# Patient Record
Sex: FEMALE | Race: WHITE | Employment: UNEMPLOYED | URBAN - METROPOLITAN AREA
[De-identification: names, ages, dates, MRNs, and addresses within clinical notes are randomized per-mention and may not be internally consistent; named-entity substitution may affect disease eponyms.]

---

## 2017-05-23 PROBLEM — Z71.2 PERSON CONSULTING FOR EXPLANATION OF EXAMINATION OR TEST FINDING: Status: ACTIVE | Noted: 2017-05-23

## 2022-12-13 ENCOUNTER — APPOINTMENT (OUTPATIENT)
Dept: CT IMAGING | Age: 67
End: 2022-12-13
Payer: MEDICARE

## 2022-12-13 ENCOUNTER — APPOINTMENT (OUTPATIENT)
Dept: GENERAL RADIOLOGY | Age: 67
End: 2022-12-13
Payer: MEDICARE

## 2022-12-13 ENCOUNTER — HOSPITAL ENCOUNTER (INPATIENT)
Age: 67
LOS: 2 days | Discharge: HOME | End: 2022-12-15
Attending: EMERGENCY MEDICINE | Admitting: INTERNAL MEDICINE
Payer: MEDICARE

## 2022-12-13 DIAGNOSIS — R11.0 NAUSEA: Primary | ICD-10-CM

## 2022-12-13 DIAGNOSIS — R42 DIZZINESS: ICD-10-CM

## 2022-12-13 PROBLEM — R26.81 UNSTEADY GAIT: Status: ACTIVE | Noted: 2022-12-13

## 2022-12-13 LAB
ALBUMIN SERPL-MCNC: 4.2 G/DL (ref 3.4–5)
ALP BLD-CCNC: 111 U/L (ref 40–129)
ALT SERPL-CCNC: 20 U/L (ref 10–40)
ANION GAP SERPL CALCULATED.3IONS-SCNC: 11 MMOL/L (ref 3–16)
AST SERPL-CCNC: 27 U/L (ref 15–37)
BASOPHILS ABSOLUTE: 0.1 K/UL (ref 0–0.2)
BASOPHILS RELATIVE PERCENT: 1 %
BILIRUB SERPL-MCNC: 0.6 MG/DL (ref 0–1)
BILIRUBIN DIRECT: <0.2 MG/DL (ref 0–0.3)
BILIRUBIN URINE: NEGATIVE
BILIRUBIN, INDIRECT: NORMAL MG/DL (ref 0–1)
BLOOD, URINE: NEGATIVE
BUN BLDV-MCNC: 15 MG/DL (ref 7–20)
CALCIUM SERPL-MCNC: 9.6 MG/DL (ref 8.3–10.6)
CHLORIDE BLD-SCNC: 106 MMOL/L (ref 99–110)
CLARITY: CLEAR
CO2: 26 MMOL/L (ref 21–32)
COLOR: YELLOW
CREAT SERPL-MCNC: 1.3 MG/DL (ref 0.6–1.2)
EKG ATRIAL RATE: 81 BPM
EKG DIAGNOSIS: NORMAL
EKG P AXIS: 54 DEGREES
EKG P-R INTERVAL: 168 MS
EKG Q-T INTERVAL: 394 MS
EKG QRS DURATION: 84 MS
EKG QTC CALCULATION (BAZETT): 457 MS
EKG R AXIS: 5 DEGREES
EKG T AXIS: 43 DEGREES
EKG VENTRICULAR RATE: 81 BPM
EOSINOPHILS ABSOLUTE: 0.3 K/UL (ref 0–0.6)
EOSINOPHILS RELATIVE PERCENT: 3.4 %
GFR SERPL CREATININE-BSD FRML MDRD: 45 ML/MIN/{1.73_M2}
GLUCOSE BLD-MCNC: 139 MG/DL (ref 70–99)
GLUCOSE URINE: NEGATIVE MG/DL
HCT VFR BLD CALC: 41.4 % (ref 36–48)
HEMOGLOBIN: 14 G/DL (ref 12–16)
INR BLD: 1.67 (ref 0.87–1.14)
KETONES, URINE: ABNORMAL MG/DL
LEUKOCYTE ESTERASE, URINE: NEGATIVE
LIPASE: 27 U/L (ref 13–60)
LYMPHOCYTES ABSOLUTE: 3.1 K/UL (ref 1–5.1)
LYMPHOCYTES RELATIVE PERCENT: 39.2 %
MAGNESIUM: 2.1 MG/DL (ref 1.8–2.4)
MCH RBC QN AUTO: 31.4 PG (ref 26–34)
MCHC RBC AUTO-ENTMCNC: 33.9 G/DL (ref 31–36)
MCV RBC AUTO: 92.6 FL (ref 80–100)
MICROSCOPIC EXAMINATION: ABNORMAL
MONOCYTES ABSOLUTE: 0.6 K/UL (ref 0–1.3)
MONOCYTES RELATIVE PERCENT: 7.9 %
NEUTROPHILS ABSOLUTE: 3.9 K/UL (ref 1.7–7.7)
NEUTROPHILS RELATIVE PERCENT: 48.5 %
NITRITE, URINE: NEGATIVE
PDW BLD-RTO: 14.5 % (ref 12.4–15.4)
PH UA: 6 (ref 5–8)
PLATELET # BLD: 283 K/UL (ref 135–450)
PMV BLD AUTO: 9.6 FL (ref 5–10.5)
POTASSIUM REFLEX MAGNESIUM: 3.5 MMOL/L (ref 3.5–5.1)
PRO-BNP: 72 PG/ML (ref 0–124)
PROTEIN UA: NEGATIVE MG/DL
PROTHROMBIN TIME: 19.7 SEC (ref 11.7–14.5)
RAPID INFLUENZA  B AGN: NEGATIVE
RAPID INFLUENZA A AGN: NEGATIVE
RBC # BLD: 4.47 M/UL (ref 4–5.2)
SARS-COV-2, NAAT: NOT DETECTED
SODIUM BLD-SCNC: 143 MMOL/L (ref 136–145)
SPECIFIC GRAVITY UA: >=1.03 (ref 1–1.03)
TOTAL PROTEIN: 7.7 G/DL (ref 6.4–8.2)
TROPONIN: <0.01 NG/ML
URINE REFLEX TO CULTURE: ABNORMAL
URINE TYPE: ABNORMAL
UROBILINOGEN, URINE: 0.2 E.U./DL
WBC # BLD: 8 K/UL (ref 4–11)

## 2022-12-13 PROCEDURE — 6360000004 HC RX CONTRAST MEDICATION: Performed by: EMERGENCY MEDICINE

## 2022-12-13 PROCEDURE — 87804 INFLUENZA ASSAY W/OPTIC: CPT

## 2022-12-13 PROCEDURE — 84484 ASSAY OF TROPONIN QUANT: CPT

## 2022-12-13 PROCEDURE — 2580000003 HC RX 258: Performed by: INTERNAL MEDICINE

## 2022-12-13 PROCEDURE — 70450 CT HEAD/BRAIN W/O DYE: CPT

## 2022-12-13 PROCEDURE — 6360000002 HC RX W HCPCS: Performed by: PHYSICIAN ASSISTANT

## 2022-12-13 PROCEDURE — 80076 HEPATIC FUNCTION PANEL: CPT

## 2022-12-13 PROCEDURE — 81003 URINALYSIS AUTO W/O SCOPE: CPT

## 2022-12-13 PROCEDURE — 80048 BASIC METABOLIC PNL TOTAL CA: CPT

## 2022-12-13 PROCEDURE — 6370000000 HC RX 637 (ALT 250 FOR IP): Performed by: INTERNAL MEDICINE

## 2022-12-13 PROCEDURE — 83880 ASSAY OF NATRIURETIC PEPTIDE: CPT

## 2022-12-13 PROCEDURE — 1200000000 HC SEMI PRIVATE

## 2022-12-13 PROCEDURE — 85025 COMPLETE CBC W/AUTO DIFF WBC: CPT

## 2022-12-13 PROCEDURE — 71045 X-RAY EXAM CHEST 1 VIEW: CPT

## 2022-12-13 PROCEDURE — 83690 ASSAY OF LIPASE: CPT

## 2022-12-13 PROCEDURE — 36415 COLL VENOUS BLD VENIPUNCTURE: CPT

## 2022-12-13 PROCEDURE — 87635 SARS-COV-2 COVID-19 AMP PRB: CPT

## 2022-12-13 PROCEDURE — 99285 EMERGENCY DEPT VISIT HI MDM: CPT

## 2022-12-13 PROCEDURE — 96374 THER/PROPH/DIAG INJ IV PUSH: CPT

## 2022-12-13 PROCEDURE — 2580000003 HC RX 258: Performed by: PHYSICIAN ASSISTANT

## 2022-12-13 PROCEDURE — 85610 PROTHROMBIN TIME: CPT

## 2022-12-13 PROCEDURE — 84443 ASSAY THYROID STIM HORMONE: CPT

## 2022-12-13 PROCEDURE — 93005 ELECTROCARDIOGRAM TRACING: CPT | Performed by: PHYSICIAN ASSISTANT

## 2022-12-13 PROCEDURE — 70496 CT ANGIOGRAPHY HEAD: CPT

## 2022-12-13 PROCEDURE — 83735 ASSAY OF MAGNESIUM: CPT

## 2022-12-13 RX ORDER — ATORVASTATIN CALCIUM 40 MG/1
40 TABLET, FILM COATED ORAL DAILY
COMMUNITY

## 2022-12-13 RX ORDER — SODIUM CHLORIDE 9 MG/ML
INJECTION, SOLUTION INTRAVENOUS PRN
Status: DISCONTINUED | OUTPATIENT
Start: 2022-12-13 | End: 2022-12-15 | Stop reason: HOSPADM

## 2022-12-13 RX ORDER — 0.9 % SODIUM CHLORIDE 0.9 %
1000 INTRAVENOUS SOLUTION INTRAVENOUS ONCE
Status: COMPLETED | OUTPATIENT
Start: 2022-12-13 | End: 2022-12-13

## 2022-12-13 RX ORDER — ACETAMINOPHEN 650 MG/1
650 SUPPOSITORY RECTAL EVERY 6 HOURS PRN
Status: DISCONTINUED | OUTPATIENT
Start: 2022-12-13 | End: 2022-12-15 | Stop reason: HOSPADM

## 2022-12-13 RX ORDER — WARFARIN SODIUM 1 MG/1
1.5 TABLET ORAL
Status: COMPLETED | OUTPATIENT
Start: 2022-12-13 | End: 2022-12-13

## 2022-12-13 RX ORDER — ONDANSETRON 2 MG/ML
4 INJECTION INTRAMUSCULAR; INTRAVENOUS ONCE
Status: COMPLETED | OUTPATIENT
Start: 2022-12-13 | End: 2022-12-13

## 2022-12-13 RX ORDER — ACETAMINOPHEN 325 MG/1
650 TABLET ORAL EVERY 6 HOURS PRN
COMMUNITY

## 2022-12-13 RX ORDER — SODIUM CHLORIDE 0.9 % (FLUSH) 0.9 %
5-40 SYRINGE (ML) INJECTION PRN
Status: DISCONTINUED | OUTPATIENT
Start: 2022-12-13 | End: 2022-12-15 | Stop reason: HOSPADM

## 2022-12-13 RX ORDER — PROPRANOLOL HCL 60 MG
60 CAPSULE, EXTENDED RELEASE 24HR ORAL DAILY
Status: DISCONTINUED | OUTPATIENT
Start: 2022-12-14 | End: 2022-12-15 | Stop reason: HOSPADM

## 2022-12-13 RX ORDER — ALBUTEROL SULFATE 2.5 MG/3ML
2.5 SOLUTION RESPIRATORY (INHALATION) EVERY 4 HOURS PRN
Status: DISCONTINUED | OUTPATIENT
Start: 2022-12-13 | End: 2022-12-15 | Stop reason: HOSPADM

## 2022-12-13 RX ORDER — VIT C/B6/B5/MAGNESIUM/HERB 173 50-5-6-5MG
500 CAPSULE ORAL DAILY
COMMUNITY

## 2022-12-13 RX ORDER — ALBUTEROL SULFATE 2.5 MG/3ML
2.5 SOLUTION RESPIRATORY (INHALATION) 4 TIMES DAILY
Status: DISCONTINUED | OUTPATIENT
Start: 2022-12-13 | End: 2022-12-13

## 2022-12-13 RX ORDER — SODIUM CHLORIDE, SODIUM LACTATE, POTASSIUM CHLORIDE, CALCIUM CHLORIDE 600; 310; 30; 20 MG/100ML; MG/100ML; MG/100ML; MG/100ML
INJECTION, SOLUTION INTRAVENOUS CONTINUOUS
Status: ACTIVE | OUTPATIENT
Start: 2022-12-13 | End: 2022-12-14

## 2022-12-13 RX ORDER — POLYETHYLENE GLYCOL 3350 17 G/17G
17 POWDER, FOR SOLUTION ORAL DAILY PRN
Status: DISCONTINUED | OUTPATIENT
Start: 2022-12-13 | End: 2022-12-15 | Stop reason: HOSPADM

## 2022-12-13 RX ORDER — LEVOTHYROXINE SODIUM 0.07 MG/1
75 TABLET ORAL DAILY
Status: DISCONTINUED | OUTPATIENT
Start: 2022-12-14 | End: 2022-12-15 | Stop reason: HOSPADM

## 2022-12-13 RX ORDER — LABETALOL HYDROCHLORIDE 5 MG/ML
10 INJECTION, SOLUTION INTRAVENOUS EVERY 4 HOURS PRN
Status: DISCONTINUED | OUTPATIENT
Start: 2022-12-13 | End: 2022-12-15 | Stop reason: HOSPADM

## 2022-12-13 RX ORDER — DULOXETIN HYDROCHLORIDE 60 MG/1
60 CAPSULE, DELAYED RELEASE ORAL 2 TIMES DAILY
Status: DISCONTINUED | OUTPATIENT
Start: 2022-12-13 | End: 2022-12-15 | Stop reason: HOSPADM

## 2022-12-13 RX ORDER — ACETAMINOPHEN 325 MG/1
650 TABLET ORAL EVERY 6 HOURS PRN
Status: DISCONTINUED | OUTPATIENT
Start: 2022-12-13 | End: 2022-12-15 | Stop reason: HOSPADM

## 2022-12-13 RX ORDER — ONDANSETRON 2 MG/ML
4 INJECTION INTRAMUSCULAR; INTRAVENOUS EVERY 6 HOURS PRN
Status: DISCONTINUED | OUTPATIENT
Start: 2022-12-13 | End: 2022-12-15 | Stop reason: HOSPADM

## 2022-12-13 RX ORDER — SODIUM CHLORIDE 0.9 % (FLUSH) 0.9 %
5-40 SYRINGE (ML) INJECTION EVERY 12 HOURS SCHEDULED
Status: DISCONTINUED | OUTPATIENT
Start: 2022-12-13 | End: 2022-12-15 | Stop reason: HOSPADM

## 2022-12-13 RX ORDER — LEVOTHYROXINE SODIUM 0.07 MG/1
75 TABLET ORAL DAILY
COMMUNITY

## 2022-12-13 RX ORDER — VIT C/B6/B5/MAGNESIUM/HERB 173 50-5-6-5MG
500 CAPSULE ORAL DAILY
Status: DISCONTINUED | OUTPATIENT
Start: 2022-12-14 | End: 2022-12-13

## 2022-12-13 RX ORDER — ONDANSETRON 4 MG/1
4 TABLET, ORALLY DISINTEGRATING ORAL EVERY 8 HOURS PRN
Status: DISCONTINUED | OUTPATIENT
Start: 2022-12-13 | End: 2022-12-14

## 2022-12-13 RX ORDER — ATORVASTATIN CALCIUM 40 MG/1
40 TABLET, FILM COATED ORAL DAILY
Status: DISCONTINUED | OUTPATIENT
Start: 2022-12-14 | End: 2022-12-15 | Stop reason: HOSPADM

## 2022-12-13 RX ADMIN — WARFARIN SODIUM 1.5 MG: 1 TABLET ORAL at 23:51

## 2022-12-13 RX ADMIN — SODIUM CHLORIDE, PRESERVATIVE FREE 10 ML: 5 INJECTION INTRAVENOUS at 23:48

## 2022-12-13 RX ADMIN — IOPAMIDOL 75 ML: 755 INJECTION, SOLUTION INTRAVENOUS at 16:52

## 2022-12-13 RX ADMIN — SODIUM CHLORIDE 1000 ML: 9 INJECTION, SOLUTION INTRAVENOUS at 15:04

## 2022-12-13 RX ADMIN — ONDANSETRON 4 MG: 2 INJECTION INTRAMUSCULAR; INTRAVENOUS at 15:02

## 2022-12-13 ASSESSMENT — ENCOUNTER SYMPTOMS
CHEST TIGHTNESS: 0
DIARRHEA: 0
NAUSEA: 1
CONSTIPATION: 0
SHORTNESS OF BREATH: 1
VOMITING: 1

## 2022-12-13 ASSESSMENT — PAIN - FUNCTIONAL ASSESSMENT: PAIN_FUNCTIONAL_ASSESSMENT: NONE - DENIES PAIN

## 2022-12-13 NOTE — PROGRESS NOTES
Pharmacy  Note  - Admission Medication History    List of [id number] medications is complete. I reviewed Rx fill history via \"Complete Dispense Report\" in Epic, and spoke to the patient. The following changes made to [id number] medication list:    ADDED:   1) atorvastatin  2) levothyroxine  3) acetaminophen  4) tumeric     REMOVED:  1) Excedrin   2) diazepam   3) doxycycline   4) esomeprazole  5) Hydromet  6) Dulera     Please Note:   1) The patient reports that they have not started taking the azithromycin that was recently prescribed. Current Outpatient Medications   Medication Instructions    acetaminophen (TYLENOL) 650 mg, Oral, EVERY 6 HOURS PRN    albuterol sulfate HFA (PROVENTIL HFA) 108 (90 BASE) MCG/ACT inhaler 2 puffs, Inhalation, EVERY 4 HOURS PRN, Space out to every 6 hours as symptoms improve.     atorvastatin (LIPITOR) 40 mg, Oral, DAILY    DULoxetine (CYMBALTA) 60 MG extended release capsule TAKE 1 CAPSULE BY MOUTH TWICE DAILY    levothyroxine (SYNTHROID) 75 mcg, Oral, DAILY    propranolol (INDERAL LA) 60 mg, Oral, DAILY    turmeric 500 mg, Oral, DAILY    warfarin (COUMADIN) 3 MG tablet TAKE 1 TABLET BY MOUTH DAILY      12/13/2022 6:49 PM  Kamille Galvin  PharmD Candidate Class of [address]

## 2022-12-13 NOTE — ED PROVIDER NOTES
[address] [facility] [address] ENCOUNTER          PHYSICIAN ASSISTANT NOTE     Date of evaluation: 12/13/2022    ADDENDUM:      Care of this patient was assumed from Polly Meek PA-C. The patient was seen for Illness  . The patient's initial evaluation and plan have been discussed with the prior provider who initially evaluated the patient. Nursing Notes, Past Medical Hx, Past Surgical Hx, Social Hx, Allergies, and Family Hx were all reviewed. Diagnostic Results         RADIOLOGY:  CTA HEAD NECK W CONTRAST   Final Result   1. Normal right internal carotid artery. .   2. Normal left internal carotid artery   3. Normal vertebral arteries. 4. Normal intracranial circulation. CT HEAD WO CONTRAST   Final Result   1. Normal brain      XR CHEST PORTABLE   Final Result   Impression: No acute cardiopulmonary abnormality.       MRI BRAIN WO CONTRAST    (Results Pending)       LABS:   Results for orders placed or performed during the hospital encounter of 12/13/22   COVID-19, Rapid    Specimen: Nasopharyngeal Swab   Result Value Ref Range    SARS-CoV-2, NAAT Not Detected Not Detected   Rapid Flu Swab    Specimen: Nasopharyngeal   Result Value Ref Range    Rapid Influenza A Ag Negative Negative    Rapid Influenza B Ag Negative Negative   BMP w/ Reflex to MG   Result Value Ref Range    Sodium 143 136 - 145 mmol/L    Potassium reflex Magnesium 3.5 3.5 - 5.1 mmol/L    Chloride 106 99 - 110 mmol/L    CO2 26 21 - 32 mmol/L    Anion Gap 11 3 - 16    Glucose 139 (H) 70 - 99 mg/dL    BUN 15 7 - 20 mg/dL    Creatinine 1.3 (H) 0.6 - 1.2 mg/dL    Est, Glom Filt Rate 45 (A) >60    Calcium 9.6 8.3 - 10.6 mg/dL   CBC with Auto Differential   Result Value Ref Range    WBC 8.0 4.0 - 11.0 K/uL    RBC 4.47 4.00 - 5.20 M/uL    Hemoglobin 14.0 12.0 - 16.0 g/dL    Hematocrit 41.4 36.0 - 48.0 %    MCV 92.6 80.0 - 100.0 fL    MCH 31.4 26.0 - 34.0 pg    MCHC 33.9 31.0 - 36.0 g/dL    RDW 14.5 12.4 - 15.4 %    Platelets 072 281 - 450 K/uL    MPV 9.6 5.0 - 10.5 fL    Neutrophils % 48.5 %    Lymphocytes % 39.2 %    Monocytes % 7.9 %    Eosinophils % 3.4 %    Basophils % 1.0 %    Neutrophils Absolute 3.9 1.7 - 7.7 K/uL    Lymphocytes Absolute 3.1 1.0 - 5.1 K/uL    Monocytes Absolute 0.6 0.0 - 1.3 K/uL    Eosinophils Absolute 0.3 0.0 - 0.6 K/uL    Basophils Absolute 0.1 0.0 - 0.2 K/uL   Hepatic Function Panel   Result Value Ref Range    Total Protein 7.7 6.4 - 8.2 g/dL    Albumin 4.2 3.4 - 5.0 g/dL    Alkaline Phosphatase 111 40 - 129 U/L    ALT 20 10 - 40 U/L    AST 27 15 - 37 U/L    Total Bilirubin 0.6 0.0 - 1.0 mg/dL    Bilirubin, Direct <0.2 0.0 - 0.3 mg/dL    Bilirubin, Indirect see below 0.0 - 1.0 mg/dL   Lipase   Result Value Ref Range    Lipase 27.0 13.0 - 60.0 U/L   Troponin   Result Value Ref Range    Troponin <0.01 <0.01 ng/mL   Brain Natriuretic Peptide   Result Value Ref Range    Pro-BNP 72 0 - 124 pg/mL   Protime-INR   Result Value Ref Range    Protime 19.7 (H) 11.7 - 14.5 sec    INR 1.67 (H) 0.87 - 1.14   Magnesium   Result Value Ref Range    Magnesium 2.10 1.80 - 2.40 mg/dL   Urinalysis with Reflex to Culture    Specimen: Urine   Result Value Ref Range    Color, UA Yellow Straw/Yellow    Clarity, UA Clear Clear    Glucose, Ur Negative Negative mg/dL    Bilirubin Urine Negative Negative    Ketones, Urine TRACE (A) Negative mg/dL    Specific Gravity, UA >=1.030 1.005 - 1.030    Blood, Urine Negative Negative    pH, UA 6.0 5.0 - 8.0    Protein, UA Negative Negative mg/dL    Urobilinogen, Urine 0.2 <2.0 E.U./dL    Nitrite, Urine Negative Negative    Leukocyte Esterase, Urine Negative Negative    Microscopic Examination Not Indicated     Urine Type Voided     Urine Reflex to Culture Not Indicated    EKG 12 Lead   Result Value Ref Range    Ventricular Rate 81 BPM    Atrial Rate 81 BPM    P-R Interval 168 ms    QRS Duration 84 ms    Q-T Interval 394 ms    QTc Calculation (Bazett) 457 ms    P Axis 54 degrees    R Axis 5 degrees    T Axis 43 degrees    Diagnosis       EKG performed in ER and to be interpreted by ER physician. Confirmed by MD, ER ([id number]),  Marco A Garza ([id number]) on 12/13/2022 3:15:32 PM       RECENT VITALS:  BP: (!) 164/69, Temp: 97.8 °F (36.6 °C), Heart Rate: 79, Resp: 18, SpO2: 96 %       ED Course     The patient was given the following medications:  Orders Placed This Encounter   Medications    ondansetron (ZOFRAN) injection 4 mg    0.9 % sodium chloride bolus    iopamidol (ISOVUE-370) 76 % injection 75 mL    DISCONTD: albuterol (PROVENTIL) nebulizer solution 2.5 mg     Order Specific Question:   Initiate RT Bronchodilator Protocol     Answer:   Yes - Inpatient Protocol    atorvastatin (LIPITOR) tablet 40 mg    DULoxetine (CYMBALTA) extended release capsule 60 mg    levothyroxine (SYNTHROID) tablet 75 mcg    propranolol (INDERAL LA) extended release capsule 60 mg    DISCONTD: turmeric 500 MG capsule 500 mg    sodium chloride flush 0.9 % injection 5-40 mL    sodium chloride flush 0.9 % injection 5-40 mL    0.9 % sodium chloride infusion    OR Linked Order Group     ondansetron (ZOFRAN-ODT) disintegrating tablet 4 mg     ondansetron (ZOFRAN) injection 4 mg    polyethylene glycol (GLYCOLAX) packet 17 g    OR Linked Order Group     acetaminophen (TYLENOL) tablet 650 mg     acetaminophen (TYLENOL) suppository 650 mg    lactated ringers infusion    labetalol (NORMODYNE;TRANDATE) injection 10 mg    albuterol (PROVENTIL) nebulizer solution 2.5 mg     Order Specific Question:   Initiate RT Bronchodilator Protocol     Answer:   Yes - Inpatient Protocol    warfarin placeholder: dosing by pharmacy     Order Specific Question:   What is the patient's goal INR? Answer:   2.0 - 3.0    warfarin (COUMADIN) tablet 1.5 mg     Order Specific Question:   Indication of Use     Answer:   Treatment-DVT/PE     Order Specific Question:   What is the patient's goal INR?      Answer:   2.0 - 3.0       CONSULTS:  IP CONSULT TO HOSPITALIST  IP CONSULT TO PHARMACY  IP CONSULT TO NEUROLOGY  IP CONSULT TO SOCIAL WORK    MEDICAL DECISION MAKING / ASSESSMENT / Omer  is a 79 y.o. female who presents the emergency department with nausea and dizziness. Vital signs stable on presentation remained stable throughout her stay. Please see previous providers documentation for initial history and physical exam.  On turnover the patient we are awaiting CTA head and neck along with Noncon of the head. CTA head and neck showed normal right internal carotid artery, left internal carotid artery, normal vertebral arteries, normal intracranial circulation. CT head was unremarkable. On reassessment patient does feel mildly improved after nausea control. At this time, I do believe she should be admitted to the hospital due to concern for posterior stroke with her mild ataxia and other symptoms. This was discussed with the patient who is agreeable. Call was placed to the hospitalist to discuss admission. This patient was also evaluated by the attending physician. All care plans were discussed and agreed upon. Clinical Impression     1. Nausea    2. Dizziness        Disposition     PATIENT REFERRED TO:  No follow-up provider specified.     DISCHARGE MEDICATIONS:  Current Discharge Medication List          DISPOSITION Admitted 12/13/2022 07:35:56 PM         Koko Mcgarry PA-C  12/14/22 0126

## 2022-12-13 NOTE — ED PROVIDER NOTES
ED Attending Attestation Note     Date of evaluation: 12/13/2022    This patient was seen by the advance practice provider. I have seen and examined the patient, agree with the workup, evaluation, management and diagnosis. The care plan has been discussed. My assessment reveals a 80-year-old female who presents with chief complaint of illness. Patient has had 2 to 3 weeks of nausea and vomiting. Intermittent. Was able to eat yesterday. States she was sent here by her doctor's office to get checked out. General: This is a wd/wn female in no acute distress who appears their stated age. Holding emesis bag but not actively vomiting. HEENT: NC/AT. PERRL. OP clear. MMM. Neck: Supple. Trachea midline. Pulmonary: Normal work of breathing, not using accessory muscles or pursed lip breathing    Cardiac: RRR    Abdomen: S/NT/ND/+BS. No cva tenderness. Musculoskeletal: WWP with no clubbing, cyanosis, or deformities noted. Vascular: +2 radial and DP pulses. Skin: Warm and dry with no lesions noted    Neuro:  AAOx4. Face is grossly symmetric. Gait not assessed. Moving all 4 extremities equally and spontaneously  .      Mariaa Johnston MD  12/13/22 [id number]

## 2022-12-13 NOTE — ED PROVIDER NOTES
[facility]  EMERGENCY DEPARTMENT ENCOUNTER          PHYSICIAN ASSISTANT NOTE       Date of evaluation: 12/13/2022    Chief Complaint     Illness    History of Present Illness     Cindy Garcia is a 79 y.o. female who presents to the emergency department for evaluation of persistent nausea and vomiting feeling generally unwell over the last 2 weeks. She initially said that she has not been able to keep anything down to eat or drink, but then said that she was able to eat a sandwich with something to drink last night and this morning. She has had persistent nausea. She reports episodes where she feels hot and flushed, then has nausea and gagging. No associated abdominal pain. No chest pain or shortness of breath, no fevers or chills. In addition she has had a mild cough with some shortness of breath, no chest pain. She has never had symptoms like this in the past and cannot identify any aggravating or relieving features. She has a history of PEs and is anticoagulated on Coumadin, also has a remote history of leukemia, with no active disease since 2004. She is not currently on chemotherapy or radiation. Review of Systems     Review of Systems   Constitutional:  Negative for chills, diaphoresis, fatigue and fever. Respiratory:  Positive for shortness of breath. Negative for chest tightness. Cardiovascular:  Negative for chest pain, palpitations and leg swelling. Gastrointestinal:  Positive for nausea and vomiting. Negative for constipation and diarrhea. Genitourinary:  Negative for dysuria, flank pain and frequency. Musculoskeletal:  Negative for myalgias. Skin:  Negative for pallor and rash. Neurological:  Negative for dizziness, weakness, numbness and headaches. Psychiatric/Behavioral:  Negative for confusion.       Past Medical, Surgical, Family, and Social History     She has a past medical history of Asthma, Cancer ([facility] [address] COPD (chronic obstructive pulmonary disease) ([facility] [address] Depression, GERD (gastroesophageal reflux disease), and PE (pulmonary embolism). She has a past surgical history that includes Cataract removal; Middle ear surgery; Hysterectomy; bone marrow transplant; and bone marrow biopsy. Her family history includes Cancer in her father; Diabetes in her brother and sister; Heart Disease in her mother; High Cholesterol in her sister; Hypertension in her sister. She reports that she quit smoking about 12 years ago. Her smoking use included cigarettes. She smoked an average of .5 packs per day. She has quit using smokeless tobacco. She reports that she does not drink alcohol and does not use drugs. Medications     Previous Medications    ALBUTEROL SULFATE HFA (PROVENTIL HFA) 108 (90 BASE) MCG/ACT INHALER    Inhale 2 puffs into the lungs every 4 hours as needed for Wheezing or Shortness of Breath (Space out to every 6 hours as symptoms improve) Space out to every 6 hours as symptoms improve. ASPIRIN-ACETAMINOPHEN-CAFFEINE (EXCEDRIN MIGRAINE) 250-250-65 MG PER TABLET    Take 1 tablet by mouth every 6 hours as needed for Pain. DIAZEPAM (VALIUM) 2 MG TABLET    Take 1-2 mg by mouth 2 times daily. DOXYCYCLINE (MONODOX) 50 MG CAPSULE    Take 1 capsule by mouth every 12 hours. DULOXETINE (CYMBALTA) 60 MG EXTENDED RELEASE CAPSULE    TAKE 1 CAPSULE BY MOUTH TWICE DAILY    ESOMEPRAZOLE (NEXIUM) 40 MG CAPSULE    TAKE 1 CAPSULE BY MOUTH EVERY MORNING BEFORE A MEAL    HYDROCODONE-HOMATROPINE (HYDROMET) 5-1.5 MG/5ML SYRUP    Take 5 mLs by mouth 2 times daily as needed. MOMETASONE-FORMOTEROL (DULERA) 100-5 MCG/ACT INHALER    Inhale 2 puffs into the lungs 2 times daily    NEXIUM 40 MG CAPSULE    TAKE ONE CAPSULE BY MOUTH EVERY MORNING BEFORE BREAKFAST    PROPRANOLOL (INDERAL LA) 80 MG CR CAPSULE    Take 60 mg by mouth daily     WARFARIN (COUMADIN) 3 MG TABLET    TAKE 1 TABLET BY MOUTH DAILY       Allergies     She is allergic to adhesive tape and pcn [penicillins].     Physical Exam     INITIAL VITALS: BP: (!) 162/108, Temp: 98.3 °F (36.8 °C), Heart Rate: 83, Resp: 20, SpO2: 99 %  Physical Exam  Vitals and nursing note reviewed. Constitutional:       General: She is not in acute distress. Appearance: Normal appearance. She is normal weight. She is not ill-appearing, toxic-appearing or diaphoretic. HENT:      Head: Normocephalic and atraumatic. Eyes:      Extraocular Movements: Extraocular movements intact. Pupils: Pupils are equal, round, and reactive to light. Cardiovascular:      Rate and Rhythm: Normal rate and regular rhythm. Pulses: Normal pulses. Heart sounds: Normal heart sounds. No murmur heard. No friction rub. No gallop. Pulmonary:      Effort: Pulmonary effort is normal. No respiratory distress. Breath sounds: Normal breath sounds. No stridor. No wheezing, rhonchi or rales. Abdominal:      General: There is no distension. Tenderness: There is no abdominal tenderness. There is no guarding or rebound. Musculoskeletal:         General: Normal range of motion. Cervical back: Normal range of motion. Right lower leg: No edema. Left lower leg: No edema. Skin:     General: Skin is warm. Neurological:      General: No focal deficit present. Mental Status: She is alert. Psychiatric:         Mood and Affect: Mood normal.         Behavior: Behavior normal.       Diagnostic Results     EKG   Interpreted in conjunction with emergency department physician Linus Zendejas MD  Rhythm: normal sinus   Rate: normal  Axis: normal  Ectopy: none  Conduction: normal  ST Segments: normal  T Waves: normal  Q Waves:none  Clinical Impression: no acute changes  Comparison:  4/20/17    RADIOLOGY:  CT HEAD WO CONTRAST   Final Result   1. Normal brain      XR CHEST PORTABLE   Final Result   Impression: No acute cardiopulmonary abnormality.       CTA HEAD NECK W CONTRAST    (Results Pending)       LABS:   Results for orders placed or performed during the hospital encounter of 12/13/22   BMP w/ Reflex to MG   Result Value Ref Range    Sodium 143 136 - 145 mmol/L    Potassium reflex Magnesium 3.5 3.5 - 5.1 mmol/L    Chloride 106 99 - 110 mmol/L    CO2 26 21 - 32 mmol/L    Anion Gap 11 3 - 16    Glucose 139 (H) 70 - 99 mg/dL    BUN 15 7 - 20 mg/dL    Creatinine 1.3 (H) 0.6 - 1.2 mg/dL    Est, Glom Filt Rate 45 (A) >60    Calcium 9.6 8.3 - 10.6 mg/dL   CBC with Auto Differential   Result Value Ref Range    WBC 8.0 4.0 - 11.0 K/uL    RBC 4.47 4.00 - 5.20 M/uL    Hemoglobin 14.0 12.0 - 16.0 g/dL    Hematocrit 41.4 36.0 - 48.0 %    MCV 92.6 80.0 - 100.0 fL    MCH 31.4 26.0 - 34.0 pg    MCHC 33.9 31.0 - 36.0 g/dL    RDW 14.5 12.4 - 15.4 %    Platelets 742 874 - 834 K/uL    MPV 9.6 5.0 - 10.5 fL    Neutrophils % 48.5 %    Lymphocytes % 39.2 %    Monocytes % 7.9 %    Eosinophils % 3.4 %    Basophils % 1.0 %    Neutrophils Absolute 3.9 1.7 - 7.7 K/uL    Lymphocytes Absolute 3.1 1.0 - 5.1 K/uL    Monocytes Absolute 0.6 0.0 - 1.3 K/uL    Eosinophils Absolute 0.3 0.0 - 0.6 K/uL    Basophils Absolute 0.1 0.0 - 0.2 K/uL   Hepatic Function Panel   Result Value Ref Range    Total Protein 7.7 6.4 - 8.2 g/dL    Albumin 4.2 3.4 - 5.0 g/dL    Alkaline Phosphatase 111 40 - 129 U/L    ALT 20 10 - 40 U/L    AST 27 15 - 37 U/L    Total Bilirubin 0.6 0.0 - 1.0 mg/dL    Bilirubin, Direct <0.2 0.0 - 0.3 mg/dL    Bilirubin, Indirect see below 0.0 - 1.0 mg/dL   Lipase   Result Value Ref Range    Lipase 27.0 13.0 - 60.0 U/L   Troponin   Result Value Ref Range    Troponin <0.01 <0.01 ng/mL   Brain Natriuretic Peptide   Result Value Ref Range    Pro-BNP 72 0 - 124 pg/mL   Protime-INR   Result Value Ref Range    Protime 19.7 (H) 11.7 - 14.5 sec    INR 1.67 (H) 0.87 - 1.14   Magnesium   Result Value Ref Range    Magnesium 2.10 1.80 - 2.40 mg/dL   EKG 12 Lead   Result Value Ref Range    Ventricular Rate 81 BPM    Atrial Rate 81 BPM    P-R Interval 168 ms    QRS Duration 84 ms    Q-T Interval 394 ms    QTc Calculation (Bazett) 457 ms    P Axis 54 degrees    R Axis 5 degrees    T Axis 43 degrees    Diagnosis       EKG performed in ER and to be interpreted by ER physician. Confirmed by MD, ER ([id number]),  [address], [address] ([id number]) on 12/13/2022 3:15:32 PM       ED BEDSIDE ULTRASOUND:  No results found. RECENT VITALS:  BP: (!) 176/89, Temp: 98.3 °F (36.8 °C), Heart Rate: 85, Resp: 18, SpO2: 96 %     Procedures   None    ED Course     Nursing Notes, Past Medical Hx,Past Surgical Hx, Social Hx, Allergies, and Family Hx were reviewed. The patient was given the following medications:  Orders Placed This Encounter   Medications    ondansetron (ZOFRAN) injection 4 mg    0.9 % sodium chloride bolus    iopamidol (ISOVUE-370) 76 % injection 75 mL       CONSULTS:  None    MEDICAL DECISION MAKING / ASSESSMENT / Raghu Ann is a 79 y.o. female presenting to the emergency department for evaluation of persistent nausea and vomiting over the last 9 days. She states that she feels hot and then has nausea with gagging although she was able to tolerate p.o. last night and today. Upon presentation she was well in appearance, seated on the stretcher in no acute distress with stable vitals. She was given Zofran and 1 L of IV fluids and was feeling much better on repeat exam and her nausea had improved. Medical work-up completed showing no leukocytosis or electrolyte abnormalities. Liver enzymes and lipase were both normal.  EKG completed showing normal sinus rhythm with low suspicion for atypical presentation for ACS and troponin was less than 0.01. Clinically she did not appear fluid overloaded. I went to discuss the symptoms with the patient further, she said that she did have an episode of double vision while driving on Friday, and felt very disoriented while driving today. In addition she is felt \"intoxicated\" since her symptoms started 9 days ago, specifically when walking.   She does not drink alcohol. When I ambulated the patient, she was unsteady on her feet, and mildly ataxic. She was able to provide a urine sample which is pending. Plan to expand work-up to assess for TIA versus stroke with CT head as well as CTA head and neck. Imaging studies are pending and my colleague Wyatt Casiano PA-C will follow up on imaging studies as well as disposition of the patient. Is outside of the window for any acute intervention for stroke since her symptoms have been persistent for greater than 1 week. This patient was also evaluated by the attending physician. All care plans were discussed and agreed upon. Clinical Impression     1. Nausea      Disposition     PATIENT REFERRED TO:  No follow-up provider specified.     DISCHARGE MEDICATIONS:  New Prescriptions    No medications on file       DISPOSITION          Sarabjit Srinivasan PA-C  12/13/22 0319

## 2022-12-14 ENCOUNTER — APPOINTMENT (OUTPATIENT)
Dept: MRI IMAGING | Age: 67
End: 2022-12-14
Payer: MEDICARE

## 2022-12-14 LAB
A/G RATIO: 1.1 (ref 1.1–2.2)
ALBUMIN SERPL-MCNC: 3.4 G/DL (ref 3.4–5)
ALP BLD-CCNC: 98 U/L (ref 40–129)
ALT SERPL-CCNC: 17 U/L (ref 10–40)
ANION GAP SERPL CALCULATED.3IONS-SCNC: 8 MMOL/L (ref 3–16)
ANION GAP SERPL CALCULATED.3IONS-SCNC: 9 MMOL/L (ref 3–16)
AST SERPL-CCNC: 25 U/L (ref 15–37)
BASOPHILS ABSOLUTE: 0.1 K/UL (ref 0–0.2)
BASOPHILS RELATIVE PERCENT: 0.7 %
BILIRUB SERPL-MCNC: 0.4 MG/DL (ref 0–1)
BUN BLDV-MCNC: 15 MG/DL (ref 7–20)
BUN BLDV-MCNC: 15 MG/DL (ref 7–20)
CALCIUM SERPL-MCNC: 8.9 MG/DL (ref 8.3–10.6)
CALCIUM SERPL-MCNC: 9 MG/DL (ref 8.3–10.6)
CHLORIDE BLD-SCNC: 105 MMOL/L (ref 99–110)
CHLORIDE BLD-SCNC: 105 MMOL/L (ref 99–110)
CO2: 27 MMOL/L (ref 21–32)
CO2: 28 MMOL/L (ref 21–32)
CREAT SERPL-MCNC: 1.3 MG/DL (ref 0.6–1.2)
CREAT SERPL-MCNC: 1.3 MG/DL (ref 0.6–1.2)
EOSINOPHILS ABSOLUTE: 0.3 K/UL (ref 0–0.6)
EOSINOPHILS RELATIVE PERCENT: 3 %
GFR SERPL CREATININE-BSD FRML MDRD: 45 ML/MIN/{1.73_M2}
GFR SERPL CREATININE-BSD FRML MDRD: 45 ML/MIN/{1.73_M2}
GLUCOSE BLD-MCNC: 121 MG/DL (ref 70–99)
GLUCOSE BLD-MCNC: 123 MG/DL (ref 70–99)
HCT VFR BLD CALC: 37.9 % (ref 36–48)
HEMOGLOBIN: 12.3 G/DL (ref 12–16)
INR BLD: 1.83 (ref 0.87–1.14)
LYMPHOCYTES ABSOLUTE: 3.4 K/UL (ref 1–5.1)
LYMPHOCYTES RELATIVE PERCENT: 38.7 %
MCH RBC QN AUTO: 30.3 PG (ref 26–34)
MCHC RBC AUTO-ENTMCNC: 32.6 G/DL (ref 31–36)
MCV RBC AUTO: 92.9 FL (ref 80–100)
MONOCYTES ABSOLUTE: 0.8 K/UL (ref 0–1.3)
MONOCYTES RELATIVE PERCENT: 8.9 %
NEUTROPHILS ABSOLUTE: 4.3 K/UL (ref 1.7–7.7)
NEUTROPHILS RELATIVE PERCENT: 48.7 %
PDW BLD-RTO: 14.6 % (ref 12.4–15.4)
PLATELET # BLD: 252 K/UL (ref 135–450)
PMV BLD AUTO: 9.1 FL (ref 5–10.5)
POTASSIUM REFLEX MAGNESIUM: 4.5 MMOL/L (ref 3.5–5.1)
POTASSIUM REFLEX MAGNESIUM: 4.6 MMOL/L (ref 3.5–5.1)
PROTHROMBIN TIME: 21.2 SEC (ref 11.7–14.5)
RBC # BLD: 4.08 M/UL (ref 4–5.2)
SEDIMENTATION RATE, ERYTHROCYTE: 14 MM/HR (ref 0–30)
SODIUM BLD-SCNC: 141 MMOL/L (ref 136–145)
SODIUM BLD-SCNC: 141 MMOL/L (ref 136–145)
TOTAL PROTEIN: 6.5 G/DL (ref 6.4–8.2)
TSH REFLEX: 2.93 UIU/ML (ref 0.27–4.2)
WBC # BLD: 8.9 K/UL (ref 4–11)

## 2022-12-14 PROCEDURE — 86780 TREPONEMA PALLIDUM: CPT

## 2022-12-14 PROCEDURE — 87086 URINE CULTURE/COLONY COUNT: CPT

## 2022-12-14 PROCEDURE — 6370000000 HC RX 637 (ALT 250 FOR IP): Performed by: INTERNAL MEDICINE

## 2022-12-14 PROCEDURE — 82746 ASSAY OF FOLIC ACID SERUM: CPT

## 2022-12-14 PROCEDURE — 99223 1ST HOSP IP/OBS HIGH 75: CPT | Performed by: NURSE PRACTITIONER

## 2022-12-14 PROCEDURE — 85610 PROTHROMBIN TIME: CPT

## 2022-12-14 PROCEDURE — 86235 NUCLEAR ANTIGEN ANTIBODY: CPT

## 2022-12-14 PROCEDURE — 84425 ASSAY OF VITAMIN B-1: CPT

## 2022-12-14 PROCEDURE — 83921 ORGANIC ACID SINGLE QUANT: CPT

## 2022-12-14 PROCEDURE — 97162 PT EVAL MOD COMPLEX 30 MIN: CPT

## 2022-12-14 PROCEDURE — 84165 PROTEIN E-PHORESIS SERUM: CPT

## 2022-12-14 PROCEDURE — 36415 COLL VENOUS BLD VENIPUNCTURE: CPT

## 2022-12-14 PROCEDURE — 85652 RBC SED RATE AUTOMATED: CPT

## 2022-12-14 PROCEDURE — 51798 US URINE CAPACITY MEASURE: CPT

## 2022-12-14 PROCEDURE — 83090 ASSAY OF HOMOCYSTEINE: CPT

## 2022-12-14 PROCEDURE — 97530 THERAPEUTIC ACTIVITIES: CPT

## 2022-12-14 PROCEDURE — C8929 TTE W OR WO FOL WCON,DOPPLER: HCPCS

## 2022-12-14 PROCEDURE — 82607 VITAMIN B-12: CPT

## 2022-12-14 PROCEDURE — 6360000002 HC RX W HCPCS: Performed by: INTERNAL MEDICINE

## 2022-12-14 PROCEDURE — 97535 SELF CARE MNGMENT TRAINING: CPT

## 2022-12-14 PROCEDURE — 2580000003 HC RX 258: Performed by: HOSPITALIST

## 2022-12-14 PROCEDURE — 85025 COMPLETE CBC W/AUTO DIFF WBC: CPT

## 2022-12-14 PROCEDURE — 1200000000 HC SEMI PRIVATE

## 2022-12-14 PROCEDURE — 84155 ASSAY OF PROTEIN SERUM: CPT

## 2022-12-14 PROCEDURE — 97166 OT EVAL MOD COMPLEX 45 MIN: CPT

## 2022-12-14 PROCEDURE — 2580000003 HC RX 258: Performed by: INTERNAL MEDICINE

## 2022-12-14 PROCEDURE — 70551 MRI BRAIN STEM W/O DYE: CPT

## 2022-12-14 PROCEDURE — 80053 COMPREHEN METABOLIC PANEL: CPT

## 2022-12-14 PROCEDURE — 97116 GAIT TRAINING THERAPY: CPT

## 2022-12-14 RX ORDER — SODIUM CHLORIDE 9 MG/ML
INJECTION, SOLUTION INTRAVENOUS CONTINUOUS
Status: DISCONTINUED | OUTPATIENT
Start: 2022-12-14 | End: 2022-12-15 | Stop reason: HOSPADM

## 2022-12-14 RX ADMIN — PROPRANOLOL HYDROCHLORIDE 60 MG: 60 CAPSULE, EXTENDED RELEASE ORAL at 09:26

## 2022-12-14 RX ADMIN — LEVOTHYROXINE SODIUM 75 MCG: 0.07 TABLET ORAL at 20:52

## 2022-12-14 RX ADMIN — SODIUM CHLORIDE, POTASSIUM CHLORIDE, SODIUM LACTATE AND CALCIUM CHLORIDE: 600; 310; 30; 20 INJECTION, SOLUTION INTRAVENOUS at 02:23

## 2022-12-14 RX ADMIN — WARFARIN SODIUM 3 MG: 2 TABLET ORAL at 18:05

## 2022-12-14 RX ADMIN — DULOXETINE HYDROCHLORIDE 60 MG: 60 CAPSULE, DELAYED RELEASE ORAL at 20:52

## 2022-12-14 RX ADMIN — SODIUM CHLORIDE: 9 INJECTION, SOLUTION INTRAVENOUS at 12:25

## 2022-12-14 RX ADMIN — SODIUM CHLORIDE, PRESERVATIVE FREE 10 ML: 5 INJECTION INTRAVENOUS at 09:26

## 2022-12-14 RX ADMIN — ATORVASTATIN CALCIUM 40 MG: 40 TABLET, FILM COATED ORAL at 09:26

## 2022-12-14 RX ADMIN — ONDANSETRON 4 MG: 2 INJECTION INTRAMUSCULAR; INTRAVENOUS at 11:05

## 2022-12-14 ASSESSMENT — PAIN SCALES - GENERAL
PAINLEVEL_OUTOF10: 0

## 2022-12-14 ASSESSMENT — LIFESTYLE VARIABLES: HOW OFTEN DO YOU HAVE A DRINK CONTAINING ALCOHOL: NEVER

## 2022-12-14 NOTE — PROGRESS NOTES
4 Eyes Admission Assessment     I agree as the admission nurse that 2 RN's have performed a thorough Head to Toe Skin Assessment on the patient. ALL assessment sites listed below have been assessed on admission. Areas assessed by both nurses:   [x]   Head, Face, and. Ears      Shoulders, Back, and Chest  [x][x]   Arms, Elbows, and Hands   [x]   Coccyx, Sacrum, and Ischium  [x]   Legs, Feet, and Heels        Does the Patient have Skin Breakdown?   No         Negrito Prevention initiated:  No   Wound Care Orders initiated:  No      [facility] nurse consulted for Pressure Injury (Stage 3,4, Unstageable, DTI, NWPT, and Complex wounds) or Negrito score 18 or lower:  No      Nurse 1 eSignature: Electronically signed by Harjinder Oneal RN on 12/13/22 at 11:34 PM EST    **SHARE this note so that the co-signing nurse is able to place an eSignature**    Nurse 2 eSignature: Electronically signed by Harjinder Oneal RN on 12/13/22 at 11:34 PM EST    Electronically signed by Stacy Powell RN on 12/14/2022 at 12:40 AM

## 2022-12-14 NOTE — PLAN OF CARE
D: Was admitted from ED per stretcher to 6324 around 2155 last pm with generalized weakness. Oriented to room, call light, and plan of care. Check orthostatic b/p and was negative last pm. Gait steady amb independently to BR. Pt stated has experienced a lot of family members and close friends who have recently passed away. Allowed pt to verbalize feelings regarding her losses. Referral made to [facility] regarding therapy grief counseling.  Stated has taken Cymbalta for yrs and c/o it making her feel weaker after taking and refused dose last pm.   A: Cont to monitor during hourly rounds    Problem: Pain  Goal: Verbalizes/displays adequate comfort level or baseline comfort level  Outcome: Progressing

## 2022-12-14 NOTE — PROGRESS NOTES
Clinical Pharmacy Progress Note    Warfarin - Management by Pharmacy    Consult Date(s): 12/13/22  Consulting Provider(s): Mary Jane Nolan MD    Assessment / Plan  1)  History of DVT/PE - Warfarin  Goal INR: 2 - 3  Concurrent Anticoagulants / Antiplatelets: None  Interactions: No significant interactions noted. Current Regimen / Plan:   INR today = 1.83  Received bolus dose yesterday (4.5mg total); INR now trending up. Will continue home dose of Warfarin 3mg po daily. Will monitor pt's clinical status and INR daily, and make dose adjustments as needed. Please call with questions--  Thanks--  Nicholas Aguirre, PharmD, BCPS, BCGP  [id number] ([facility])   12/14/2022 2:15 PM      Interval update:  No acute findings on MRI per notes. Subjective/Objective:   Anais Villa is a 79 y.o. female with a PMHx significant for asthma, CML, COPD, Depression, PE (on warfarin and GERD) who is admitted with double vision and unstable gait. [facility] to dose warfarin.     Ht Readings from Last 1 Encounters:   12/13/22 5' 7\" (1.702 m)     Wt Readings from Last 1 Encounters:   12/14/22 254 lb 3.2 oz (115.3 kg)     Prior / Home Warfarin Regimen:  Warfarin 3 mg PO QDay    Date INR Warfarin   12/13 1.67 3 mg (at home)  1.5 mg at [facility]   12/14 1.83                  Recent Labs     12/13/22  1521 12/14/22  0815 12/14/22  0816   INR 1.67*  --  1.83*   HGB 14.0  --  12.3     --  252   LABALBU 4.2 3.4  --    CREATININE 1.3* 1.3* 1.3*

## 2022-12-14 NOTE — PROGRESS NOTES
Turmeric  ordered for patient. It is considered an herbal or nutraceutical product, which our pharmacy does not stock. Per [facility] policy, herbals are not to be administered in the hospital.  Will discontinue order. May resume on discharge. Please call pharmacy with any questions. Thank you!

## 2022-12-14 NOTE — CONSULTS
Clinical Pharmacy Progress Note    Warfarin - Management by Pharmacy    Consult Date(s): 12/13/22  Consulting Provider(s): Mary Jane Nolan MD    Assessment / Plan  History of DVT/PE - Warfarin  Goal INR: 2 - 3  Concurrent Anticoagulants / Antiplatelets: None  Interactions: No significant interactions noted. Current Regimen / Plan:   Patient's home dose of Warfarin 3 mg QDAY. She has had her dose today. INR 1.67 today. Will give a extra 1.5 mg dose today. Will check an INR tomorrow and dose based on level. Will monitor pt's clinical status and INR daily, and make dose adjustments as needed. Thank you for consulting Pharmacy! Janae Orozco, PharmD  Main Pharmacy: [id number]  12/13/2022 10:50 PM        Subjective/Objective:   Anais Villa is a 79 y.o. female with a PMHx significant for asthma, CML, COPD, Depression, PE (on warfarin and GERD) who is admitted with double vision and unstable gait. [facility]     Ht Readings from Last 1 Encounters:   12/13/22 5' 7\" (1.702 m)     Wt Readings from Last 1 Encounters:   12/13/22 258 lb 3.2 oz (117.1 kg)     Prior / Home Warfarin Regimen:  3 mg qday    Date INR Warfarin   12/13 1.67 3 mg (at home)  1.5 mg (ordered)                      Recent Labs     12/13/22  1521   INR 1.67*   HGB 14.0      LABALBU 4.2   CREATININE 1.3*

## 2022-12-14 NOTE — H&P
Hospital Medicine History & Physical      PCP: Tenzin Gutiérrez    Date of Admission: 12/13/2022    Date of Service: Pt seen/examined on 12/13/2022 and Admitted to Inpatient with expected LOS greater than two midnights due to medical therapy. Chief Complaint: Not feeling well      History Of Present Illness: The patient is a 79 y.o. female with medical history as below who was sent to emergency room from [facility] office for evaluation of double vision and unsteady gait. Patient follows with [facility] after bone marrow transplant and Coumadin management for history of DVT PE. Patient reports that for a few weeks she has been feeling unwell with intermittent cough, shortness of breath, dizziness, nausea and vomiting. Today she was very unsteady and was seeing double. When she arrived to [facility] she was directed to emergency room. Patient underwent stroke work-up CTA negative for LVO, head CT okay. Patient's gait was unsteady in the emergency room. She has no prior history of stroke. Past Medical History:        Diagnosis Date    Asthma     Cancer ([address]     CML    COPD (chronic obstructive pulmonary disease) (HCC)     Depression     GERD (gastroesophageal reflux disease)     PE (pulmonary embolism)        Past Surgical History:        Procedure Laterality Date    BONE MARROW BIOPSY      BONE MARROW TRANSPLANT      CATARACT REMOVAL      HYSTERECTOMY (CERVIX STATUS UNKNOWN)      MIDDLE EAR SURGERY         Medications Prior to Admission:    Prior to Admission medications    Medication Sig Start Date End Date Taking?  Authorizing Provider   atorvastatin (LIPITOR) 40 MG tablet Take 40 mg by mouth daily   Yes Historical Provider, MD   levothyroxine (SYNTHROID) 75 MCG tablet Take 75 mcg by mouth Daily   Yes Historical Provider, MD   acetaminophen (TYLENOL) 325 MG tablet Take 650 mg by mouth every 6 hours as needed for Pain   Yes Historical Provider, MD   turmeric 500 MG CAPS Take 500 mg by mouth daily   Yes Historical Provider, MD   warfarin (COUMADIN) 3 MG tablet TAKE 1 TABLET BY MOUTH DAILY 7/3/17   Tank Rodas MD   DULoxetine (CYMBALTA) 60 MG extended release capsule TAKE 1 CAPSULE BY MOUTH TWICE DAILY 6/30/17   Tank Rodas MD   albuterol sulfate HFA (PROVENTIL HFA) 108 (90 BASE) MCG/ACT inhaler Inhale 2 puffs into the lungs every 4 hours as needed for Wheezing or Shortness of Breath (Space out to every 6 hours as symptoms improve) Space out to every 6 hours as symptoms improve. 4/20/17   Anders Ignacio MD   propranolol (INDERAL LA) 60 MG extended release capsule Take 60 mg by mouth daily     Historical Provider, MD       Allergies:  Adhesive tape and Pcn [penicillins]    Social History:  The patient currently lives at home with family    TOBACCO:   reports that she quit smoking about 12 years ago. Her smoking use included cigarettes. She smoked an average of .5 packs per day. She has quit using smokeless tobacco.  ETOH:   reports no history of alcohol use. Family History:  Reviewed in detail and Positive as follows:        Problem Relation Age of Onset    Heart Disease Mother     Cancer Father         lung    Diabetes Sister     Hypertension Sister     High Cholesterol Sister     Diabetes Brother        REVIEW OF SYSTEMS:   Positive and negative as noted in the HPI. All other systems reviewed and negative. PHYSICAL EXAM:    BP (!) 176/89   Pulse 85   Temp 98.3 °F (36.8 °C) (Oral)   Resp 18   Ht 5' 7\" (1.702 m)   Wt 258 lb 3.2 oz (117.1 kg)   SpO2 96%   BMI 40.44 kg/m²     General appearance: No apparent distress appears stated age and cooperative. HEENT Normal cephalic, atraumatic without obvious deformity. Pupils equal, round, and reactive to light. Extra ocular muscles intact. Conjunctivae/corneas clear. Neck: Supple, No jugular venous distention/bruits. Trachea midline without thyromegaly or adenopathy with full range of motion.   Lungs: Diminished at bases but no crackles or wheezing appreciated  Heart: Regular rate and rhythm with Normal S1/S2 without murmurs, rubs or gallops, point of maximum impulse non-displaced  Abdomen: Soft, non-tender or non-distended without rigidity or guarding and positive bowel sounds all four quadrants. Extremities: No clubbing, cyanosis, or edema bilaterally. Skin: Skin color, texture, turgor normal.   Neurologic: Alert and oriented X 3, grossly non-focal, gait is not tested  Mental status: Alert, oriented, thought content appropriate.   Capillary refill is brisk  Peripheral pulses 2+    CXR:  I have reviewed the CXR with the following interpretation: Lungs are clear, no pleural effusion  EKG:  I have reviewed the EKG with the following interpretation: Sinus without acute ST-T wave changes    CBC   Recent Labs     12/13/22  1521   WBC 8.0   HGB 14.0   HCT 41.4         RENAL  Recent Labs     12/13/22  1521      K 3.5      CO2 26   BUN 15   CREATININE 1.3*     LFT'S  Recent Labs     12/13/22  1521   AST 27   ALT 20   BILIDIR <0.2   BILITOT 0.6   ALKPHOS 111     COAG  Recent Labs     12/13/22  1521   INR 1.67*     CARDIAC ENZYMES  Recent Labs     12/13/22  1521   TROPONINI <0.01       U/A:    Lab Results   Component Value Date/Time    COLORU Not Entered 02/05/2015 06:39 PM    WBCUA 0-2 02/05/2015 06:51 PM    RBCUA 0-2 02/05/2015 06:51 PM    BACTERIA 2+ 02/05/2015 06:51 PM    CLARITYU Not Entered 02/05/2015 06:39 PM    SPECGRAV >=1.030 02/05/2015 06:39 PM    LEUKOCYTESUR Negative 02/05/2015 06:39 PM    BLOODU Negative 02/05/2015 06:39 PM    GLUCOSEU Negative 02/05/2015 06:39 PM       ABG  No results found for: CEP4NMP, BEART, Y1TXNMTJ, PHART, THGBART, VSM4HJJ, PO2ART, JIJ3KGI        Active Hospital Problems    Diagnosis Date Noted    Unsteady gait [R26.81] 12/13/2022     Priority: Medium         ASSESSMENT/PLAN:    Unsteady gait:  CTA head and neck okay, head CT okay  Obtain brain MRI and get PT/OT  Consider neurology eval    Patient had medtronic loop recorder in place, will call for interrogation    Non specific symptoms of \"not feeling well\"  Check flu and covid 19, TSH, UA  Gentle IVF  Repeat basic labs and monitor symptom progression    Asthma:  No evidence of exacerbation  Continue bronchodilators    H/o DVT on warfarin:  Consult pharmacy for warfarin management    History of bone marrow transplant for CML in 2004, follows with [facility], in long-term remission. DVT Prophylaxis: Warfarin  Diet: No diet orders on file  Code Status: Prior  PT/OT Eval Status: Ordered    Dispo -inpatient       Unknown MD Dereje    Thank you [phone] for the opportunity to be involved in this patient's care. If you have any questions or concerns please feel free to contact me at [phone].

## 2022-12-14 NOTE — PROGRESS NOTES
Occupational Therapy  Facility/Department: [id number] [facility] Initial Assessment/ Treatment    Name: Art Almeida  : 1955  MRN: [id number]  Date of Service: 2022    Discharge Recommendations:   Art Almeida scored a 20/24 on the AM-PAC ADL Inpatient form. Current research shows that an AM-PAC score of 18 or greater is typically associated with a discharge to the patient's home setting. Based on the patient's AM-PAC score, and their current ADL deficits, it is recommended that the patient have 2-3 sessions per week of Occupational Therapy at d/c to increase the patient's independence. At this time, this patient demonstrates the endurance and safety to discharge home with home vs OP services and a follow up treatment frequency of 2-3x/wk. Please see assessment section for further patient specific details. If patient discharges prior to next session this note will serve as a discharge summary. Please see below for the latest assessment towards goals. Patient Diagnosis(es): The primary encounter diagnosis was Nausea. A diagnosis of Dizziness was also pertinent to this visit. Past Medical History:  has a past medical history of Asthma, Cancer ([facility] [address] COPD (chronic obstructive pulmonary disease) ([facility] [address] Depression, GERD (gastroesophageal reflux disease), and PE (pulmonary embolism). Past Surgical History:  has a past surgical history that includes Cataract removal; Middle ear surgery; Hysterectomy; bone marrow transplant; and bone marrow biopsy. Treatment Diagnosis: decreased ADLs and transfers secondary to dizziness      Assessment   Performance deficits / Impairments: Decreased functional mobility ; Decreased endurance;Decreased coordination;Decreased ADL status; Decreased balance;Decreased strength;Decreased safe awareness;Decreased high-level IADLs  Assessment: Prior to admission pt was living at home with her , was independent with ADLs and IADLs.  Pt now presents below her baseline, demonstrating CGA for mobiltiy and transfers, min A for LB dressing. Pt would benefit from continued OT while in acute care, plans to return home with her  to assist PRN. Would benefit from [facility] at discharge as well. Treatment Diagnosis: decreased ADLs and transfers secondary to dizziness  Prognosis: Fair  Decision Making: Medium Complexity  REQUIRES OT FOLLOW-UP: Yes  Activity Tolerance  Activity Tolerance: Patient Tolerated treatment well;Patient limited by fatigue  Activity Tolerance Comments: Pt reported min to mod fatigue, nauseated after walking in hallway. RN aware. BP prior to walking 164/98 HR 73. After walking 156/94. Plan   Occupational Therapy Plan  Times Per Week: 2-5  Current Treatment Recommendations: Strengthening, Balance training, Self-Care / ADL, Functional mobility training, Patient/Caregiver education & training, Coordination training, Safety education & training     Restrictions  Position Activity Restriction  Other position/activity restrictions: up with assist    Subjective   General  Chart Reviewed: Yes  Additional Pertinent Hx: Pt admitted to ED from [facility] office with c/o unsteady gait. Pt had history of bone marrow transplant. Brain MRI: no acute findings; minimal chronic small vessel ischemic disease and/or minimal age-related degenerative change. Neuro following, imaging not suggestive of stroke. PMHx includes: asthma, CA, COPD, depression, PE  Family / Caregiver Present: Yes ()  Referring Practitioner: Lavell Sher MD  Diagnosis: dizziness  Subjective  Subjective: Pt seated EOB upon arrival, agreeable to OT eval and treat.      Social/Functional History  Social/Functional History  Lives With:  (+ son)  Type of Home: House  Home Layout: Two level, Bed/Bath upstairs  Home Access: Stairs to enter with rails  Entrance Stairs - Number of Steps: 7-8  Entrance Stairs - Rails: Both  Bathroom Shower/Tub: Walk-in shower (walk in shower in the basement- tub shower upstairs but it doesn't work)  Home Equipment:  (no equipment)  Has the patient had two or more falls in the past year or any fall with injury in the past year?: Unknown (pt doesn't remember falling but  reporting she may have had a fall recently)  ADL Assistance: Independent  Homemaking Responsibilities:  (shares duties with family)  Ambulation Assistance: Independent  Transfer Assistance: Independent  Active : Yes  Type of Occupation: not currently working       Objective              Safety Devices  Type of Devices: Call light within reach; Chair alarm in place; Left in chair;Nurse notified     Toilet Transfers  Toilet - Technique: Ambulating  Equipment Used: Standard toilet     ADL  Feeding: Beverage management;Setup  Grooming: Contact guard assistance  Grooming Skilled Clinical Factors: standing at sink for hand hygiene  UE Dressing: Setup  UE Dressing Skilled Clinical Factors: donning gown from behind  LE Dressing: Minimal assistance  LE Dressing Skilled Clinical Factors: pt required cueing to sit to thread LEs into brief, pt initially standing to don. Pulling pants up over hips with CGA  Toileting: Contact guard assistance     Activity Tolerance  Activity Tolerance: Patient limited by fatigue;Patient limited by pain;Treatment limited secondary to medical complications (limited by nausea/vomiting)     Transfers  Sit to stand: Contact guard assistance  Stand to sit: Contact guard assistance  Transfer Comments: Mobiltiy in room to bathroom without AE, noted some unsteadiness and reaching out for furniture. Mobility in hallway with RW with slow and effortful gait, pt reporting increased nasuea and wanting to return to room. Pt with emesis upon return to room (BP WFL).   Vision  Vision: Impaired  Vision Exceptions: Wears glasses at all times  Hearing  Hearing: Within functional limits  Cognition  Overall Cognitive Status: [facility]  Orientation  Overall Orientation Status: Within Functional Limits (some STM loss noted)                  Education Given To: Patient; Family  Education Provided: Role of Therapy;Plan of Care  Education Method: Verbal  Barriers to Learning: None  Education Outcome: Verbalized understanding      Treatment included functional transfer training, ADLs, and patient education. AM-PAC Score        AM-PAC Inpatient Daily Activity Raw Score: 20 (12/14/22 1222)  AM-PAC Inpatient ADL T-Scale Score : 42.03 (12/14/22 1222)  ADL Inpatient CMS 0-100% Score: 38.32 (12/14/22 1222)  ADL Inpatient CMS G-Code Modifier : CJ (12/14/22 1222)    Tinneti Score       Goals  Short Term Goals  Time Frame for Short Term Goals: by dc  Short Term Goal 1: Pt will complete LB dressing with supervision  Short Term Goal 2: Pt will complete standing level grooming with supervision  Short Term Goal 3: Pt will complete toileting with supervision  Patient Goals   Patient goals : to go home       Therapy Time   Individual Concurrent Group Co-treatment   Time In 1010         Time Out 1107         Minutes 57         Timed Code Treatment Minutes: 42 Minutes (+15 min eval)     Cecilia PICHARDO  [address] [facility], OTR/L [id number]

## 2022-12-14 NOTE — CONSULTS
Neurology / [place]  Consult Note    Shubham Kahn MD is requesting this consult. Reason for Consult: unsteady gait  Admission Chief Complaint: unsteady gait    History of Present Illness     Mita Lopez is a 79 y.o. y/o female with PMH significant for CML (in remission), DVT/PE (subtherapeutic on coumadin this admission), COPD< depression, HTN and thyroid disease who I am asked to see for unsteady gait. Per my interview with the patient: she developed her unsteady gait suddenly, 3 weeks ago. It worsened when she developed a cough ( reports viral illness) but then leveled off. She has had intermittent diplopia and blurry vision in the right eye (more recent) since that time. Associated with this she also had nausea and vomiting. She denies diplopia at the time of my visit. She hasn't been up to walk so she isn't sure if her gait is still affected. She denies any new neck pain. Reports chronic BLE numbness in the soles of her feet. MRI brain and CT-A were reassuring against stroke. She reports an irregular heartbeat but denies any history of afib (has loop recorder, however). REVIEW OF SYSTEMS:   Constitutional- No weight loss or fevers   Eyes- No diplopia. No photophobia. Ears/nose/throat- No dysphagia. No Dysarthria   Cardiovascular- No palpitations. No chest pain   Respiratory- No dyspnea. No Cough   Gastrointestinal- No Abdominal pain. No Vomiting. Genitourinary- No incontinence. No urinary retention   Musculoskeletal- No myalgia. No arthralgia   Skin- No rash. No easy bruising. Psychiatric- No depression. No anxiety   Endocrine- No diabetes. No thyroid issues. Hematologic- No bleeding difficulty.  No fatigue   Neurologic- no aphasia; no weakness    Past Medical, Surgical, Family, and Social History   PAST MEDICAL HISTORY:  Past Medical History:   Diagnosis Date    Asthma     Cancer ([facility] [address]     CML    COPD (chronic obstructive pulmonary disease) ([facility] [address]     Depression GERD (gastroesophageal reflux disease)     PE (pulmonary embolism)      SURGICAL HISTORY:  Past Surgical History:   Procedure Laterality Date    BONE MARROW BIOPSY      BONE MARROW TRANSPLANT      CATARACT REMOVAL      HYSTERECTOMY (CERVIX STATUS UNKNOWN)      MIDDLE EAR SURGERY       FAMILY HISTORY & SOCIAL HISTORY:  Family history non-contributory  Family History   Problem Relation Age of Onset    Heart Disease Mother     Cancer Father         lung    Diabetes Sister     Hypertension Sister     High Cholesterol Sister     Diabetes Brother      Social History     Tobacco Use    Smoking status: Former     Packs/day: 0.50     Types: Cigarettes     Quit date: 2010     Years since quittin.5    Smokeless tobacco: Former   Vaping Use    Vaping Use: Never used   Substance Use Topics    Alcohol use: No    Drug use: No         Allergies & Outpatient Medications   ALLERGIES:  Allergies   Allergen Reactions    Adhesive Tape Hives    Pcn [Penicillins]      HOME MEDICATIONS:  Current Discharge Medication List        CONTINUE these medications which have NOT CHANGED    Details   atorvastatin (LIPITOR) 40 MG tablet Take 40 mg by mouth daily      levothyroxine (SYNTHROID) 75 MCG tablet Take 75 mcg by mouth Daily      acetaminophen (TYLENOL) 325 MG tablet Take 650 mg by mouth every 6 hours as needed for Pain      turmeric 500 MG CAPS Take 500 mg by mouth daily      warfarin (COUMADIN) 3 MG tablet TAKE 1 TABLET BY MOUTH DAILY  Qty: 30 tablet, Refills: 2      DULoxetine (CYMBALTA) 60 MG extended release capsule TAKE 1 CAPSULE BY MOUTH TWICE DAILY  Qty: 60 capsule, Refills: 5      albuterol sulfate HFA (PROVENTIL HFA) 108 (90 BASE) MCG/ACT inhaler Inhale 2 puffs into the lungs every 4 hours as needed for Wheezing or Shortness of Breath (Space out to every 6 hours as symptoms improve) Space out to every 6 hours as symptoms improve.   Qty: 1 Inhaler, Refills: 0      propranolol (INDERAL LA) 60 MG extended release capsule Take 60 mg by mouth daily            Physical Exam   PHYSICAL EXAM:  Vitals:    12/13/22 2303 12/14/22 0225 12/14/22 0656 12/14/22 0916   BP: (!) 164/69 (!) 166/67 (!) 158/88 (!) 173/82   Pulse:  72 66 73   Resp:  20 16 16   Temp:  97.6 °F (36.4 °C) 98.2 °F (36.8 °C) 98.6 °F (37 °C)   TempSrc:  Oral Oral Oral   SpO2:  98% 97% 94%   Weight:   254 lb 3.2 oz (115.3 kg)    Height:             General: Alert, no distress, well-nourished  Neurologic  Mental status: alert  orientation to person, place, time, situation   Attention intact as able to attend well to the exam     Language fluent in conversation   Comprehension intact; follows simple commands    Cranial nerves:   CN2: Difficulty with left visual field in both eyes  Fundoscopic Exam unable to visualize fundi  CN 3,4,6: Pupils equal and reactive to light, extraocular muscles intact  CN5: Facial sensation symmetric   CN7: Face symmetric without dysarthria  CN8: Hearing symmetric to spoken voice  CN9: Palate elevated symmetrically  CN11: Traps full strength on shoulder shrug  CN12: Tongue midline with protrusion    Motor Exam: full strength all four limbs    Deep tendon reflexes: normal in upper limbs. Hypoactive to absent in lower limbs. Sensory: light touch intact and symmetric in all 4 extremities. No sensory extinction on bilateral simultaneous stimulation  Cerebellar/coordination: finger nose finger normal without ataxia  Tone: normal in all 4 extremities  Gait: deferred for safety    OTHER SYSTEMS:  Cardiovascular: Warm, appears well perfused   Respiratory: Easy, non-labored respiratory pattern   Abdominal: Abdomen is without distention   Extremities: Upper and lower extremities are atraumatic in appearance without deformity. No swelling or erythema. Psychiatric: Cooperative with exam    Diagnostic Testing Results   IMAGES:  Images personally reviewed and agree w/ radiology interpretation. CTA of Head / Neck w/ Contrast:  1.  Normal right internal carotid artery. .   2. Normal left internal carotid artery   3. Normal vertebral arteries. 4. Normal intracranial circulation. MRI Brain w/o Contrast:  1. No acute intracranial abnormality. No evidence of acute infarct. 2. Mild left ethmoid sinus disease. 3. Minimal nonspecific periventricular and subcortical FLAIR signal abnormality suggesting minimal chronic small vessel ischemic disease and/or minimal age-related degenerative change. LABS:  All results below personally reviewed. Pertinent positives & negatives are addressed in Impression & Recommendations below. LABS   Metabolic Panel Recent Labs     12/13/22  1521 12/14/22  0816    141   K 3.5 4.5    105   CO2 26 28   BUN 15 15   CREATININE 1.3* 1.3*   GLUCOSE 139* 123*   CALCIUM 9.6 8.9   LABALBU 4.2  --    MG 2.10  --    ALKPHOS 111  --    ALT 20  --    AST 27  --       CBC / Coags Recent Labs     12/13/22  1521 12/14/22  0816   WBC 8.0 8.9   RBC 4.47 4.08   HGB 14.0 12.3   HCT 41.4 37.9    252   INR 1.67* 1.83*      Other Recent Labs     12/13/22  2156   COVID19 Not Detected        CURRENT SCHEDULED MEDICATIONS   Inpatient Medications     atorvastatin, 40 mg, Oral, Daily    DULoxetine, 60 mg, Oral, BID    levothyroxine, 75 mcg, Oral, Daily    propranolol, 60 mg, Oral, Daily    sodium chloride flush, 5-40 mL, IntraVENous, 2 times per day    warfarin placeholder: dosing by pharmacy, , Other, See Admin Instructions   Infusions    sodium chloride          IMPRESSION & RECOMMENDATIONS     IMPRESSION:  Ms. Silvia Reilly is a 79year old lady with a history of HTN, CML (in remission), DVT/PE (subtherapeutic on Coumadin), and thyroid disease who presents with three weeks of gait difficulty, nausea/vomiting, and intermittent diplopia. MRI brain and CT-A head and neck were unrevealing. Her neurologic exam is notable for some visual field difficulties (left visual field cut) and bilateral feet numbness (ongoing for some time).  Her  reports she hasn't been taking her thyroid medication as prescribed. She questions if this could be related to stress (very tearful on my visit) though this is a diagnosis of exclusion. RECOMMENDATIONS:  - TSH with reflex  - Paraneoplastic panel  - Resume coumadin for DVT/PE. INR subtherapeutic. - Check echocardiogram  - PT/OT assessment  - Agree with interrogating loop recorder. - EMG/NCS as outpatient.  - Neuropathy labs: serum protein electrophoresis, B12, anti-nuclear antibody, ESR, RPR, HbA1c. Additional testing, if history is suggestive: HIV serology, urine/blood for heavy metals or porphyrins, RH factor, Sjogren's syndrome (anti-Ro, Anti-La antibodies), lyme disease, thiamine, homocysteine, methylamalonic acid, hepatitis B and C serologies    ALMA DELIA Mendez - CNP   Neurology & Neurocritical Care   12/14/2022 9:29 AM    Floor / PCU Patients:  Neurology Line: [phone]  [facility]: [facility] Neurology    Time independently spent by Nurse Practitioner reviewing chart and prior testing, obtaining history from patient, examining patient, ordering appropriate medications / diagnostics, reviewing results of diagnostics, counseling and educating patient / family, communicating plan with other providers and documenting clinical information in the EMR was approximately 60 minutes.

## 2022-12-14 NOTE — PROGRESS NOTES
Hospitalist Progress Note      Name:  Juma Avendaño /Age/Sex: 1955  (78 y.o. female)   MRN & CSN:  [id number] & [id number] Admission Date/Time: 2022  2:35 PM   Location:  [id number] PCP: [facility] Day: 2    Assessment and Plan:    79 y.o. female with medical history as below who was sent to emergency room from [facility] office for evaluation of double vision and unsteady gait. Patient follows with [facility] after bone marrow transplant and Coumadin management for history of DVT PE. Patient reports that for a few weeks she has been feeling unwell with intermittent cough, shortness of breath, dizziness, nausea and vomiting. Today she was very unsteady and was seeing double. When she arrived to [facility] she was directed to emergency room. Patient underwent stroke work-up CTA negative for LVO, head CT okay. Patient's gait was unsteady in the emergency room. She has no prior history of stroke. Unsteady gait:  CTA head and neck non acute , head CT non acute   Obtain brain MRI non acute too ,  PT/OT eval   Consult  neurology      Patient had [employer] loop recorder in place, call for interrogation     Non specific symptoms of \"not feeling well\"  Check flu and covid 19 not detected , UA looks clean   Check TSH   Repeat basic labs and monitor symptom progression     Asthma:  No evidence of exacerbation  Continue bronchodilators     H/o DVT on warfarin:  Consult pharmacy for warfarin management  INR today 1.83     History of bone marrow transplant for CML in , follows with [facility], in long-term remission    Diet ADULT DIET;  Regular  ADULT ORAL NUTRITION SUPPLEMENT; Breakfast, Lunch, Dinner; Frozen Oral Supplement   DVT Prophylaxis [] Lovenox, []  Heparin, [] SCDs, [] Ambulation   GI Prophylaxis [] PPI,  [] H2 Blocker,  [] Carafate,  [] Diet/Tube Feeds   Code Status Full Code   Disposition Patient requires continued admission due to    MDM [] Low, [] Moderate,[]  High  Patient's risk as above due to      History of Present Illness: The patient was seen and examined at bedside  Reports double vision and dizziness is better   MRI of brain non acute    Objective: Intake/Output Summary (Last 24 hours) at 12/14/2022 0913  Last data filed at 12/14/2022 0650  Gross per 24 hour   Intake 1900 ml   Output 150 ml   Net 1750 ml      Vitals:   Vitals:    12/14/22 0656   BP: (!) 158/88   Pulse: 66   Resp: 16   Temp: 98.2 °F (36.8 °C)   SpO2: 97%     Physical Exam:   GEN Awake.  Alert , not in respiratory distress, not in pain  HEENT: PEERLA, , supple neck,   Chest: air entry equal bilaterally, no wheezing or crepitation  Heart: S1 and S2 heard, no murmur, no gallop or rub, regular rate  Abdomen: soft, ND , Nt, +BS  Extremities: no cyanosis, tenderness or erythema, peripheral pulses audible  Neurology: alert, oriented x3, able to move 4 limbs    Medications:   Medications:    atorvastatin  40 mg Oral Daily    DULoxetine  60 mg Oral BID    levothyroxine  75 mcg Oral Daily    propranolol  60 mg Oral Daily    sodium chloride flush  5-40 mL IntraVENous 2 times per day    warfarin placeholder: dosing by pharmacy   Other See Admin Instructions      Infusions:    sodium chloride       PRN Meds: sodium chloride, 1 spray, PRN  sodium chloride flush, 5-40 mL, PRN  sodium chloride, , PRN  ondansetron, 4 mg, Q8H PRN   Or  ondansetron, 4 mg, Q6H PRN  polyethylene glycol, 17 g, Daily PRN  acetaminophen, 650 mg, Q6H PRN   Or  acetaminophen, 650 mg, Q6H PRN  labetalol, 10 mg, Q4H PRN  albuterol, 2.5 mg, Q4H PRN          Electronically signed by Chastity Randle MD on 12/14/2022 at 9:13 AM

## 2022-12-15 VITALS
HEART RATE: 62 BPM | BODY MASS INDEX: 39.99 KG/M2 | SYSTOLIC BLOOD PRESSURE: 143 MMHG | HEIGHT: 67 IN | OXYGEN SATURATION: 91 % | DIASTOLIC BLOOD PRESSURE: 62 MMHG | WEIGHT: 254.8 LBS | RESPIRATION RATE: 16 BRPM | TEMPERATURE: 97.9 F

## 2022-12-15 LAB
A/G RATIO: 1.3 (ref 1.1–2.2)
ALBUMIN SERPL-MCNC: 3.7 G/DL (ref 3.4–5)
ALP BLD-CCNC: 96 U/L (ref 40–129)
ALT SERPL-CCNC: 16 U/L (ref 10–40)
ANION GAP SERPL CALCULATED.3IONS-SCNC: 6 MMOL/L (ref 3–16)
ANTI-SS-A IGG: <0.2 AI (ref 0–0.9)
ANTI-SS-B IGG: <0.2 AI (ref 0–0.9)
AST SERPL-CCNC: 22 U/L (ref 15–37)
BASOPHILS ABSOLUTE: 0 K/UL (ref 0–0.2)
BASOPHILS RELATIVE PERCENT: 0.5 %
BILIRUB SERPL-MCNC: 0.5 MG/DL (ref 0–1)
BUN BLDV-MCNC: 15 MG/DL (ref 7–20)
CALCIUM SERPL-MCNC: 8.7 MG/DL (ref 8.3–10.6)
CHLORIDE BLD-SCNC: 104 MMOL/L (ref 99–110)
CO2: 30 MMOL/L (ref 21–32)
CREAT SERPL-MCNC: 1.3 MG/DL (ref 0.6–1.2)
EOSINOPHILS ABSOLUTE: 0.2 K/UL (ref 0–0.6)
EOSINOPHILS RELATIVE PERCENT: 3 %
FOLATE: 6.2 NG/ML (ref 4.78–24.2)
GFR SERPL CREATININE-BSD FRML MDRD: 45 ML/MIN/{1.73_M2}
GLUCOSE BLD-MCNC: 114 MG/DL (ref 70–99)
HCT VFR BLD CALC: 39.7 % (ref 36–48)
HEMOGLOBIN: 12.8 G/DL (ref 12–16)
HOMOCYSTEINE: 11 UMOL/L (ref 0–10)
INR BLD: 1.74 (ref 0.87–1.14)
LYMPHOCYTES ABSOLUTE: 3.8 K/UL (ref 1–5.1)
LYMPHOCYTES RELATIVE PERCENT: 45.4 %
MCH RBC QN AUTO: 30.2 PG (ref 26–34)
MCHC RBC AUTO-ENTMCNC: 32.2 G/DL (ref 31–36)
MCV RBC AUTO: 93.7 FL (ref 80–100)
MONOCYTES ABSOLUTE: 0.7 K/UL (ref 0–1.3)
MONOCYTES RELATIVE PERCENT: 8.9 %
NEUTROPHILS ABSOLUTE: 3.5 K/UL (ref 1.7–7.7)
NEUTROPHILS RELATIVE PERCENT: 42.2 %
PDW BLD-RTO: 14.4 % (ref 12.4–15.4)
PLATELET # BLD: 250 K/UL (ref 135–450)
PMV BLD AUTO: 9.3 FL (ref 5–10.5)
POTASSIUM REFLEX MAGNESIUM: 4.5 MMOL/L (ref 3.5–5.1)
PROTHROMBIN TIME: 20.3 SEC (ref 11.7–14.5)
RBC # BLD: 4.23 M/UL (ref 4–5.2)
SODIUM BLD-SCNC: 140 MMOL/L (ref 136–145)
TOTAL PROTEIN: 6.5 G/DL (ref 6.4–8.2)
URINE CULTURE, ROUTINE: NORMAL
VITAMIN B-12: 1236 PG/ML (ref 211–911)
WBC # BLD: 8.3 K/UL (ref 4–11)

## 2022-12-15 PROCEDURE — 85025 COMPLETE CBC W/AUTO DIFF WBC: CPT

## 2022-12-15 PROCEDURE — 85610 PROTHROMBIN TIME: CPT

## 2022-12-15 PROCEDURE — 36415 COLL VENOUS BLD VENIPUNCTURE: CPT

## 2022-12-15 PROCEDURE — 99232 SBSQ HOSP IP/OBS MODERATE 35: CPT | Performed by: NURSE PRACTITIONER

## 2022-12-15 PROCEDURE — 6370000000 HC RX 637 (ALT 250 FOR IP): Performed by: INTERNAL MEDICINE

## 2022-12-15 PROCEDURE — 80053 COMPREHEN METABOLIC PANEL: CPT

## 2022-12-15 PROCEDURE — 2580000003 HC RX 258: Performed by: HOSPITALIST

## 2022-12-15 RX ADMIN — SODIUM CHLORIDE: 9 INJECTION, SOLUTION INTRAVENOUS at 02:45

## 2022-12-15 RX ADMIN — PROPRANOLOL HYDROCHLORIDE 60 MG: 60 CAPSULE, EXTENDED RELEASE ORAL at 08:28

## 2022-12-15 RX ADMIN — ATORVASTATIN CALCIUM 40 MG: 40 TABLET, FILM COATED ORAL at 08:28

## 2022-12-15 RX ADMIN — DULOXETINE HYDROCHLORIDE 60 MG: 60 CAPSULE, DELAYED RELEASE ORAL at 08:28

## 2022-12-15 NOTE — PROGRESS NOTES
Clinical Pharmacy Progress Note    Warfarin - Management by Pharmacy    Consult Date(s): 12/13/22  Consulting Provider(s): Bhargav Reddy MD    Assessment / Plan  1)  History of DVT/PE - Warfarin  Goal INR: 2 - 3  Concurrent Anticoagulants / Antiplatelets: None  Interactions: No significant interactions noted. Current Regimen / Plan:   INR today = 1.74  Will give bolus dose of Warfarin 4.5mg po x1 today, then continue home dose of Warfarin 3mg po daily. Will monitor pt's clinical status and INR daily, and make dose adjustments as needed. If discharged home today, recommend having patient take Warfarin 4.5mg (1.5 tablets) po x1 tonight, then resume 3mg po daily tomorrow. Recommend INR check early next week with her hematologist/oncologist.    Please call with questions--  Thanks--  An Santiago, PharmD, [facility], [address] [facility]   12/15/2022 10:14 AM      Interval update: Worked with PT/OT yesterday. No acute findings on MRI. Possible discharge home soon per neurology. Subjective/Objective:   Artjosiah Champagne is a 79 y.o. female with a PMHx significant for asthma, CML, COPD, Depression, PE (on warfarin and GERD) who is admitted with double vision and unstable gait. [facility] is consulted to dose warfarin.     Ht Readings from Last 1 Encounters:   12/13/22 5' 7\" (1.702 m)     Wt Readings from Last 1 Encounters:   12/15/22 254 lb 12.8 oz (115.6 kg)     Prior / Home Warfarin Regimen:  Warfarin 3 mg PO QDay  Has 3mg tablets at home  Patient's outpt hematologist / oncologist manages her warfarin therapy    Date INR Warfarin   12/13 1.67 3 mg (at home)  1.5 mg at [facility]   12/14 1.83 3 mg   12/15 1.74             Recent Labs     12/13/22  1521 12/14/22  0815 12/14/22  0816 12/15/22  0844   INR 1.67*  --  1.83* 1.74*   HGB 14.0  --  12.3 12.8     --  252 250   LABALBU 4.2 3.4  --  3.7   CREATININE 1.3* 1.3* 1.3* 1.3*

## 2022-12-15 NOTE — PROGRESS NOTES
NEUROLOGY / NEUROCRITICAL CARE PROGRESS NOTE       Patient Name: Tatyana Sandoval YOB: 1955   Sex: Female Age: 79 yrs     CC / Reason for Consult: difficulty walking, diplopia     Interval Hx / Changes over last 24 hours:   Expresses feeling worried she'll be labeled as having mental problems. I reassured her that having neurologic problems does not equate to mental instability. She denies any interval problems. Feels ready to go home. We discussed the importance of outpatient follow up for lab work and EMG/NCS  PT AM-PAC 19/24  OT AM-PAC 20/24  She denies ETOH use or DM    ROS:  +nausea with walking. Denies diplopia    HISTORY   Admission HPI:   Tatyana Sandoval is a 79 y.o. y/o female with PMH significant for CML (in remission), DVT/PE (subtherapeutic on coumadin this admission), COPD< depression, HTN and thyroid disease who I am asked to see for unsteady gait. Per my interview with the patient: she developed her unsteady gait suddenly, 3 weeks ago. It worsened when she developed a cough ( reports viral illness) but then leveled off. She has had intermittent diplopia and blurry vision in the right eye (more recent) since that time. Associated with this she also had nausea and vomiting. She denies diplopia at the time of my visit. She hasn't been up to walk so she isn't sure if her gait is still affected. She denies any new neck pain. Reports chronic BLE numbness in the soles of her feet. MRI brain and CT-A were reassuring against stroke. She reports an irregular heartbeat but denies any history of afib (has loop recorder, however). PMH Past Medical History:   Diagnosis Date    Asthma     Cancer ([facility] [address]     CML    COPD (chronic obstructive pulmonary disease) (H[facility])     Depression     GERD (gastroesophageal reflux disease)     PE (pulmonary embolism)       Allergies Allergies   Allergen Reactions    Adhesive Tape Hives    Pcn [Penicillins]       Diet ADULT DIET;  Regular  ADULT ORAL NUTRITION SUPPLEMENT; Breakfast, Lunch, Dinner; Frozen Oral Supplement   Isolation No active isolations     CURRENT SCHEDULED MEDICATIONS   Inpatient Medications     warfarin, 3 mg, Oral, Daily    atorvastatin, 40 mg, Oral, Daily    DULoxetine, 60 mg, Oral, BID    levothyroxine, 75 mcg, Oral, Daily    propranolol, 60 mg, Oral, Daily    sodium chloride flush, 5-40 mL, IntraVENous, 2 times per day   Infusions    sodium chloride 75 mL/hr at 12/15/22 0245    sodium chloride                LABS   Metabolic Panel Recent Labs     12/13/22  1521 12/14/22  0815 12/14/22  0816    141 141   K 3.5 4.6 4.5    105 105   CO2 26 27 28   BUN 15 15 15   CREATININE 1.3* 1.3* 1.3*   GLUCOSE 139* 121* 123*   CALCIUM 9.6 9.0 8.9   LABALBU 4.2 3.4  --    MG 2.10  --   --    ALKPHOS 111 98  --    ALT 20 17  --    AST 27 25  --       CBC / Coags Recent Labs     12/13/22  1521 12/14/22  0816   WBC 8.0 8.9   RBC 4.47 4.08   HGB 14.0 12.3   HCT 41.4 37.9    252   INR 1.67* 1.83*      Other Recent Labs     12/13/22  2156   COVID19 Not Detected     TSH 2.93  ESR 14 mm/hr       DIAGNOSTICS   IMAGES:  Images personally reviewed and agree w/ radiology interpretation. CTA of Head / Neck w/ Contrast:  1. Normal right internal carotid artery. .   2. Normal left internal carotid artery   3. Normal vertebral arteries. 4. Normal intracranial circulation. MRI Brain w/o Contrast:  1. No acute intracranial abnormality. No evidence of acute infarct. 2. Mild left ethmoid sinus disease. 3. Minimal nonspecific periventricular and subcortical FLAIR signal abnormality suggesting minimal chronic small vessel ischemic disease and/or minimal age-related degenerative change. Echo   Left ventricular cavity size is normal with mild concentric left ventricular   hypertrophy.    Overall left ventricular systolic function appears normal with an ejection   fraction of 55-60%   No regional wall motion abnormalities   Normal diastolic function. Mild tricuspid regurgitation. PHYSICAL EXAMINATION     PHYSICAL EXAM:  Vitals:    12/14/22 1931 12/15/22 0014 12/15/22 0329 12/15/22 0612   BP: 133/64 (!) 146/79 111/74    Pulse: 67 64 60    Resp: 16 20 16    Temp: 98.1 °F (36.7 °C) 97.7 °F (36.5 °C) 97.9 °F (36.6 °C)    TempSrc: Oral Oral Oral    SpO2: 97% 93% 91%    Weight:    254 lb 12.8 oz (115.6 kg)   Height:           General: Alert, no distress, well-nourished  Neurologic  Mental status: alert  orientation to person, place, time, situation              Attention intact as able to attend well to the exam                Language fluent in conversation              Comprehension intact; follows simple commands     Cranial nerves:   CN2: visual fields grossly full   Fundoscopic Exam unable to visualize fundi  CN 3,4,6: Pupils equal and reactive to light, extraocular muscles intact  CN5: Facial sensation symmetric   CN7: Face symmetric without dysarthria  CN8: Hearing symmetric to spoken voice     Motor Exam: full strength all four limbs     Deep tendon reflexes: normal in upper limbs. Hypoactive to absent in lower limbs. Sensory:  BLE numbness in the soles of her feet. Cerebellar/coordination: finger nose finger normal without ataxia  Tone: normal in all 4 extremities  Gait: deferred for safety     OTHER SYSTEMS:  Cardiovascular: Warm, appears well perfused   Respiratory: Easy, non-labored respiratory pattern   Abdominal: Abdomen is without distention   Extremities: Upper and lower extremities are atraumatic in appearance without deformity. No swelling or erythema. Psychiatric: Cooperative with exam    ASSESSMENT & RECOMMENDATIONS   Assessment:  Ms. Toro Fong is a 80 y/o lady with a history of HTN, CML, DVT/PE (on Coumadin) and thyroid disease who presents with three weeks of difficulty walking, nausea/vomiting and intermittent diplopia. She reports BLE numbness in both of her feet. Reflexes are hypoactive and she denies myelopathic signs. Suspect she has a sensory ataxia leading to her gait difficulty, though this is difficult to assess without EMG/NCS. Plan:  - F/U with neurology as outpatient. Labs sent 12/14. Will need EMG/NCS as outpatient. - OK to d/c from neuro perspective  - Continue Coumadin    Please call with any questions, concerns, or change in exam.    ALMA DELIA Knowles - CNP   Neurology & Neurocritical Care   12/15/2022 8:22 AM    Floor / PCU Patients:  Neurology Line: [phone]  [facility]: [facility] Neurology    I spent 25 minutes in the care of this patient. Over 50% of that time was in face-to-face counseling regarding disease process, diagnostic testing, preventative measures, and answering patient and family questions.

## 2022-12-15 NOTE — PLAN OF CARE
Problem: Discharge Planning  Goal: Discharge to home or other facility with appropriate resources  12/15/2022 0944 by Angelina Morris RN  Outcome: Progressing  12/14/2022 2112 by Yolie Allan RN  Outcome: Progressing  Flowsheets (Taken 12/14/2022 1931)  Discharge to home or other facility with appropriate resources: Identify barriers to discharge with patient and caregiver     Problem: Safety - Adult  Goal: Free from fall injury  12/15/2022 0944 by Angelina Morris RN  Outcome: Progressing  12/14/2022 2112 by Yolie Allan RN  Outcome: Progressing     Problem: ABCDS Injury Assessment  Goal: Absence of physical injury  12/14/2022 2112 by Yolie Allan RN  Outcome: Progressing

## 2022-12-15 NOTE — PROGRESS NOTES
Patient clear for discharge. AVS printed and discussed with the patient. Any questions the patient had was addressed. IV removed. Patient's belongings are packed and leaving with patient.

## 2022-12-15 NOTE — PLAN OF CARE
Problem: Discharge Planning  Goal: Discharge to home or other facility with appropriate resources  Outcome: Progressing  Flowsheets (Taken 12/14/2022 1931)  Discharge to home or other facility with appropriate resources: Identify barriers to discharge with patient and caregiver     Problem: Safety - Adult  Goal: Free from fall injury  12/14/2022 2112 by Bertram Wei RN  Outcome: Progressing     Problem: ABCDS Injury Assessment  Goal: Absence of physical injury  12/14/2022 2112 by Bertram Wei RN  Outcome: Progressing

## 2022-12-15 NOTE — CARE COORDINATION
Case Management Assessment            Discharge Note                    Date / Time of Note: 12/15/2022 12:50 PM                  Discharge Note Completed by: Thor Rivera RN    Patient Name: Akiko Frank   YOB: 1955  Diagnosis: Dizziness [R42]  Nausea [R11.0]  Unsteady gait [R26.81]   Date / Time: 12/13/2022  2:35 PM    Current PCP: Vaibhav patient: No    Hospitalization in the last 30 days: No    Advance Directives:  Code Status: Full Code  [place] DNR form completed and on chart: No    Financial:  Payor: Madhu Canseco / Plan: MEDICARE PART A AND B / Product Type: *No Product type* /      Pharmacy:    [facility] #[id number] - [facility], [facility] [phone] - F [phone]  [address] [place] [id number]  Phone: [phone] Fax: [address] [place] [address] [phone] [facility] [phone]  [address] [place] [address]  Phone: [phone] Fax: [phone]      Assistance purchasing medications?: Potential Assistance Purchasing Medications: No  Assistance provided by Case Management: None at this time    Does patient want to participate in local refill/ meds to beds program?:      Meds To Beds General Rules:  1. Can ONLY be done Monday- Friday between 8:30am-5pm  2. Prescription(s) must be in pharmacy by 3pm to be filled same day  3. Copy of patient's insurance/ prescription drug card and patient face sheet must be sent along with the prescription(s)  4. Cost of Rx cannot be added to hospital bill. If financial assistance is needed, please contact unit  or ;  or  CANNOT provide pharmacy voucher for patients co-pays  5.  Patients can then  the prescription on their way out of the hospital at discharge, or pharmacy can deliver to the bedside if staff is available. (payment due at time of pick-up or delivery - cash, check, or card accepted) Able to afford home medications/ co-pay costs: Yes    ADLS:  Current PT AM-PAC Score: 19 /24  Current OT AM-PAC Score: 20 /24      DISCHARGE Disposition: Home- No Services Needed    LOC at discharge: Not Applicable  CHRISTINE Completed: No    Notification completed in HENS/PAS?:  Not Applicable    IMM Completed:   Not Indicated    Transportation:  Transportation PLAN for discharge: family   Mode of Transport: Private Car  Reason for medical transport: Not Applicable  Name of Transport Company: Not Applicable  Time of Transport: when family available    Transport form completed: No    Home Care:  [address] ordered at discharge: No  [address]: Not Applicable  Orders faxed: No    Durable Medical Equipment:  DME Provider: defer  Equipment obtained during hospitalization: NA    Home Oxygen and Respiratory Equipment:  Oxygen needed at discharge?: No  [address]: Not Applicable  Portable tank available for discharge?: Not Indicated    Dialysis:  Dialysis patient: No    Dialysis Center:  Not Applicable    Hospice Services:  Location: Not Applicable  Agency: Not Applicable    Consents signed: Not Indicated    Referrals made at [facility] for outpatient continued care:  Not Applicable    Additional CM Notes:     CM  confirmed  d/c home today . -  Patient to follow up out pt  as  instructed  . New Rx:   None  noted        Schedule an appointment with Dr. Henrietta Rodriguez MD as soon as possible for a visit in 2 week(s)  [place] [address]   [phone]  CM  met with  pt  to see if she was  agreeable to  [address]  ,  [address]  but she  declines  and  also  declined  RW that was  recommended  for  safety .       The Plan for Transition of Care is related to the following treatment goals of Dizziness [R42]  Nausea [R11.0]  Unsteady gait [R26.81]    The Patient and/or patient representative Cindy and her family were provided with a choice of provider and agrees with the discharge plan Yes    Freedom of choice list was provided with basic dialogue that supports the patient's individualized plan of care/goals and shares the quality data associated with the providers.  Yes    Care Transitions patient: No    Sabine Rockwell RN  [facility]  Case Management Department  Ph: [phone]

## 2022-12-15 NOTE — DISCHARGE SUMMARY
HOSPITALISTS DISCHARGE SUMMARY    Patient Demographics    PatientDeepika Maravilla  Date of Birth. 1955  MRN. [id number]     Primary care provider. [address]  (Tel: None)    Admit date: 12/13/2022    Discharge date (blank if same as Note Date): Note Date: 12/15/2022     Reason for Hospitalization. Chief Complaint   Patient presents with    Illness         Significant Findings. Principal Problem:    Unsteady gait  Resolved Problems:    * No resolved hospital problems. *       Problems and results from this hospitalization that need follow up. Significant test results and incidental findings. MRI BRAIN WO CONTRAST   Final Result      1. No acute intracranial abnormality. No evidence of acute infarct. 2. Mild left ethmoid sinus disease. 3. Minimal nonspecific periventricular and subcortical FLAIR signal abnormality suggesting minimal chronic small vessel ischemic disease and/or minimal age-related degenerative change. CTA HEAD NECK W CONTRAST   Final Result   1. Normal right internal carotid artery. .   2. Normal left internal carotid artery   3. Normal vertebral arteries. 4. Normal intracranial circulation. CT HEAD WO CONTRAST   Final Result   1. Normal brain      XR CHEST PORTABLE   Final Result   Impression: No acute cardiopulmonary abnormality. Invasive procedures and treatments. Problem-based Hospital Course.  79 y.o. female with medical history as below who was sent to emergency room from [facility] office for evaluation of double vision and unsteady gait. Patient follows with [facility] after bone marrow transplant and Coumadin management for history of DVT PE    Patient did had an CTA head and MRI which were reviewed no acute stroke or acute finding. Neurology did evaluate the patient. Symptoms did improve. Neurology recommended outpatient EMG nerve conduction studies. Discussed with patient about this.     Advised to follow-up with [facility] for loop recorder evaluation. Generalized fatigue symptoms. TSH is normal.  Consults. IP CONSULT TO HOSPITALIST  IP CONSULT TO PHARMACY  IP CONSULT TO NEUROLOGY  IP CONSULT TO SOCIAL WORK    Physical examination on discharge day. BP (!) 143/62   Pulse 62   Temp 97.9 °F (36.6 °C) (Oral)   Resp 16   Ht 5' 7\" (1.702 m)   Wt 254 lb 12.8 oz (115.6 kg)   SpO2 91%   BMI 39.91 kg/m²   General appearance. Alert. Looks comfortable. HEENT. Sclera clear. Moist mucus membranes. Cardiovascular. Regular rate and rhythm, normal S1, S2. No murmur. Respiratory. Not using accessory muscles. Clear to auscultation bilaterally, no wheeze. Gastrointestinal. Abdomen soft, non-tender, not distended, normal bowel sounds  Neurology. Facial symmetry. No speech deficits. Moving all extremities equally. Extremities. No edema in lower extremities. Skin. Warm, dry, normal turgor        Condition at time of discharge stable  Medication instructions provided to patient at discharge. Medication List        CONTINUE taking these medications      acetaminophen 325 MG tablet  Commonly known as: TYLENOL     albuterol sulfate  (90 Base) MCG/ACT inhaler  Commonly known as: Proventil HFA  Inhale 2 puffs into the lungs every 4 hours as needed for Wheezing or Shortness of Breath (Space out to every 6 hours as symptoms improve) Space out to every 6 hours as symptoms improve. atorvastatin 40 MG tablet  Commonly known as: LIPITOR     DULoxetine 60 MG extended release capsule  Commonly known as: CYMBALTA  TAKE 1 CAPSULE BY MOUTH TWICE DAILY     levothyroxine 75 MCG tablet  Commonly known as: SYNTHROID     propranolol 60 MG extended release capsule  Commonly known as: INDERAL LA     turmeric 500 MG Caps     warfarin 3 MG tablet  Commonly known as: COUMADIN  Take as directed. If you are unsure how to take this medication, talk to your nurse or doctor.   Original instructions: TAKE 1 TABLET BY MOUTH DAILY              Discharge recommendations given to patient. Follow Up. Neurology and primary care provider  Disposition. home  Activity. activity as tolerated  Diet: ADULT DIET; Regular  ADULT ORAL NUTRITION SUPPLEMENT; Breakfast, Lunch, Dinner; Frozen Oral Supplement      Spent 35 minutes in discharge process.     Signed:  Pushpa Boswell MD     12/15/2022 12:56 PM

## 2022-12-16 LAB
ALBUMIN SERPL-MCNC: 2.8 G/DL (ref 3.1–4.9)
ALPHA-1-GLOBULIN: 0.2 G/DL (ref 0.2–0.4)
ALPHA-2-GLOBULIN: 0.7 G/DL (ref 0.4–1.1)
BETA GLOBULIN: 1.4 G/DL (ref 0.9–1.6)
GAMMA GLOBULIN: 1.2 G/DL (ref 0.6–1.8)
SPE/IFE INTERPRETATION: NORMAL
TOTAL PROTEIN: 6.3 G/DL (ref 6.4–8.2)

## 2022-12-17 LAB — T PALLIDUM ANTIBODIES (TP-PA): NON REACTIVE

## 2022-12-18 LAB — METHYLMALONIC ACID: 0.23 UMOL/L (ref 0–0.4)

## 2022-12-20 LAB — VITAMIN B1 WHOLE BLOOD: 126 NMOL/L (ref 70–180)
